# Patient Record
Sex: MALE | Race: WHITE | URBAN - METROPOLITAN AREA
[De-identification: names, ages, dates, MRNs, and addresses within clinical notes are randomized per-mention and may not be internally consistent; named-entity substitution may affect disease eponyms.]

---

## 2017-02-27 ENCOUNTER — OFFICE VISIT (OUTPATIENT)
Dept: FAMILY MEDICINE CLINIC | Age: 57
End: 2017-02-27

## 2017-02-27 VITALS
HEART RATE: 82 BPM | SYSTOLIC BLOOD PRESSURE: 110 MMHG | BODY MASS INDEX: 22.6 KG/M2 | DIASTOLIC BLOOD PRESSURE: 72 MMHG | WEIGHT: 144 LBS | TEMPERATURE: 99.1 F | OXYGEN SATURATION: 100 % | HEIGHT: 67 IN

## 2017-02-27 DIAGNOSIS — J02.9 PHARYNGITIS, UNSPECIFIED ETIOLOGY: ICD-10-CM

## 2017-02-27 DIAGNOSIS — J06.9 URI, ACUTE: Primary | ICD-10-CM

## 2017-02-27 LAB
INFLUENZA A ANTIBODY: NEGATIVE
INFLUENZA B ANTIBODY: NEGATIVE
S PYO AG THROAT QL: NORMAL

## 2017-02-27 PROCEDURE — 87804 INFLUENZA ASSAY W/OPTIC: CPT | Performed by: NURSE PRACTITIONER

## 2017-02-27 PROCEDURE — 99213 OFFICE O/P EST LOW 20 MIN: CPT | Performed by: NURSE PRACTITIONER

## 2017-02-27 PROCEDURE — 87880 STREP A ASSAY W/OPTIC: CPT | Performed by: NURSE PRACTITIONER

## 2017-02-27 RX ORDER — BROMPHENIRAMINE MALEATE, PSEUDOEPHEDRINE HYDROCHLORIDE, AND DEXTROMETHORPHAN HYDROBROMIDE 2; 30; 10 MG/5ML; MG/5ML; MG/5ML
5 SYRUP ORAL 4 TIMES DAILY PRN
Qty: 200 ML | Refills: 0 | Status: SHIPPED | OUTPATIENT
Start: 2017-02-27 | End: 2017-10-04

## 2017-02-27 RX ORDER — CLARITHROMYCIN 500 MG/1
500 TABLET, COATED ORAL 2 TIMES DAILY
Qty: 20 TABLET | Refills: 0 | Status: SHIPPED | OUTPATIENT
Start: 2017-02-27 | End: 2017-03-09

## 2017-02-27 ASSESSMENT — ENCOUNTER SYMPTOMS
GASTROINTESTINAL NEGATIVE: 1
EYES NEGATIVE: 1
ABDOMINAL PAIN: 0
RHINORRHEA: 0
WHEEZING: 0
DIARRHEA: 0
VOMITING: 0
COUGH: 1
SWOLLEN GLANDS: 0
SORE THROAT: 1
SINUS PAIN: 1
ALLERGIC/IMMUNOLOGIC NEGATIVE: 1
NAUSEA: 0
SINUS PRESSURE: 1

## 2017-03-01 LAB — THROAT CULTURE: NORMAL

## 2017-07-28 ENCOUNTER — OFFICE VISIT (OUTPATIENT)
Dept: ORTHOPEDIC SURGERY | Age: 57
End: 2017-07-28

## 2017-07-28 VITALS — HEIGHT: 67 IN | WEIGHT: 143.96 LBS | BODY MASS INDEX: 22.6 KG/M2

## 2017-07-28 DIAGNOSIS — S69.81XD TFCC (TRIANGULAR FIBROCARTILAGE COMPLEX) INJURY, RIGHT, SUBSEQUENT ENCOUNTER: ICD-10-CM

## 2017-07-28 DIAGNOSIS — R52 PAIN: Primary | ICD-10-CM

## 2017-07-28 PROCEDURE — 20605 DRAIN/INJ JOINT/BURSA W/O US: CPT | Performed by: ORTHOPAEDIC SURGERY

## 2017-07-28 PROCEDURE — 99213 OFFICE O/P EST LOW 20 MIN: CPT | Performed by: ORTHOPAEDIC SURGERY

## 2017-09-27 ENCOUNTER — TELEPHONE (OUTPATIENT)
Dept: FAMILY MEDICINE CLINIC | Age: 57
End: 2017-09-27

## 2017-09-27 DIAGNOSIS — Z11.4 SCREENING FOR HIV (HUMAN IMMUNODEFICIENCY VIRUS): ICD-10-CM

## 2017-09-27 DIAGNOSIS — Z12.5 SCREENING PSA (PROSTATE SPECIFIC ANTIGEN): ICD-10-CM

## 2017-09-27 DIAGNOSIS — Z11.59 ENCOUNTER FOR HEPATITIS C SCREENING TEST FOR LOW RISK PATIENT: ICD-10-CM

## 2017-09-27 DIAGNOSIS — Z00.00 ROUTINE GENERAL MEDICAL EXAMINATION AT A HEALTH CARE FACILITY: Primary | ICD-10-CM

## 2017-09-29 DIAGNOSIS — Z11.59 ENCOUNTER FOR HEPATITIS C SCREENING TEST FOR LOW RISK PATIENT: ICD-10-CM

## 2017-09-29 DIAGNOSIS — Z00.00 ROUTINE GENERAL MEDICAL EXAMINATION AT A HEALTH CARE FACILITY: ICD-10-CM

## 2017-09-29 DIAGNOSIS — Z12.5 SCREENING PSA (PROSTATE SPECIFIC ANTIGEN): ICD-10-CM

## 2017-09-29 DIAGNOSIS — Z11.4 SCREENING FOR HIV (HUMAN IMMUNODEFICIENCY VIRUS): ICD-10-CM

## 2017-09-29 LAB
A/G RATIO: 2 (ref 1.1–2.2)
ALBUMIN SERPL-MCNC: 4.6 G/DL (ref 3.4–5)
ALP BLD-CCNC: 52 U/L (ref 40–129)
ALT SERPL-CCNC: 27 U/L (ref 10–40)
ANION GAP SERPL CALCULATED.3IONS-SCNC: 13 MMOL/L (ref 3–16)
AST SERPL-CCNC: 28 U/L (ref 15–37)
BASOPHILS ABSOLUTE: 0 K/UL (ref 0–0.2)
BASOPHILS RELATIVE PERCENT: 0.5 %
BILIRUB SERPL-MCNC: 0.6 MG/DL (ref 0–1)
BUN BLDV-MCNC: 18 MG/DL (ref 7–20)
CALCIUM SERPL-MCNC: 9.9 MG/DL (ref 8.3–10.6)
CHLORIDE BLD-SCNC: 102 MMOL/L (ref 99–110)
CHOLESTEROL, TOTAL: 210 MG/DL (ref 0–199)
CO2: 27 MMOL/L (ref 21–32)
CREAT SERPL-MCNC: 1.1 MG/DL (ref 0.9–1.3)
EOSINOPHILS ABSOLUTE: 0.2 K/UL (ref 0–0.6)
EOSINOPHILS RELATIVE PERCENT: 4.4 %
GFR AFRICAN AMERICAN: >60
GFR NON-AFRICAN AMERICAN: >60
GLOBULIN: 2.3 G/DL
GLUCOSE BLD-MCNC: 90 MG/DL (ref 70–99)
HCT VFR BLD CALC: 48 % (ref 40.5–52.5)
HDLC SERPL-MCNC: 78 MG/DL (ref 40–60)
HEMOGLOBIN: 16.3 G/DL (ref 13.5–17.5)
HEPATITIS C ANTIBODY INTERPRETATION: NORMAL
LDL CHOLESTEROL CALCULATED: 119 MG/DL
LYMPHOCYTES ABSOLUTE: 2 K/UL (ref 1–5.1)
LYMPHOCYTES RELATIVE PERCENT: 36.3 %
MCH RBC QN AUTO: 31.6 PG (ref 26–34)
MCHC RBC AUTO-ENTMCNC: 34 G/DL (ref 31–36)
MCV RBC AUTO: 92.9 FL (ref 80–100)
MONOCYTES ABSOLUTE: 0.4 K/UL (ref 0–1.3)
MONOCYTES RELATIVE PERCENT: 8.2 %
NEUTROPHILS ABSOLUTE: 2.8 K/UL (ref 1.7–7.7)
NEUTROPHILS RELATIVE PERCENT: 50.6 %
PDW BLD-RTO: 13.2 % (ref 12.4–15.4)
PLATELET # BLD: 213 K/UL (ref 135–450)
PMV BLD AUTO: 8.8 FL (ref 5–10.5)
POTASSIUM SERPL-SCNC: 4.9 MMOL/L (ref 3.5–5.1)
PROSTATE SPECIFIC ANTIGEN: 0.69 NG/ML (ref 0–4)
RBC # BLD: 5.17 M/UL (ref 4.2–5.9)
SODIUM BLD-SCNC: 142 MMOL/L (ref 136–145)
TOTAL PROTEIN: 6.9 G/DL (ref 6.4–8.2)
TRIGL SERPL-MCNC: 67 MG/DL (ref 0–150)
TSH SERPL DL<=0.05 MIU/L-ACNC: 1.85 UIU/ML (ref 0.27–4.2)
VITAMIN D 25-HYDROXY: 53.4 NG/ML
VLDLC SERPL CALC-MCNC: 13 MG/DL
WBC # BLD: 5.5 K/UL (ref 4–11)

## 2017-10-02 LAB — HIV-1 AND HIV-2 ANTIBODIES: NORMAL

## 2017-10-04 ENCOUNTER — OFFICE VISIT (OUTPATIENT)
Dept: FAMILY MEDICINE CLINIC | Age: 57
End: 2017-10-04

## 2017-10-04 VITALS
DIASTOLIC BLOOD PRESSURE: 74 MMHG | HEIGHT: 67 IN | HEART RATE: 74 BPM | OXYGEN SATURATION: 98 % | SYSTOLIC BLOOD PRESSURE: 116 MMHG | TEMPERATURE: 97.7 F | BODY MASS INDEX: 21.82 KG/M2 | WEIGHT: 139 LBS

## 2017-10-04 DIAGNOSIS — E73.9 INTESTINAL DISACCHARIDASE DEFICIENCY: ICD-10-CM

## 2017-10-04 DIAGNOSIS — G89.29 CHRONIC GENERALIZED ABDOMINAL PAIN: ICD-10-CM

## 2017-10-04 DIAGNOSIS — Z00.00 ROUTINE GENERAL MEDICAL EXAMINATION AT A HEALTH CARE FACILITY: Primary | ICD-10-CM

## 2017-10-04 DIAGNOSIS — E78.2 MIXED HYPERLIPIDEMIA: ICD-10-CM

## 2017-10-04 DIAGNOSIS — J30.1 NON-SEASONAL ALLERGIC RHINITIS DUE TO POLLEN: ICD-10-CM

## 2017-10-04 DIAGNOSIS — R10.84 CHRONIC GENERALIZED ABDOMINAL PAIN: ICD-10-CM

## 2017-10-04 DIAGNOSIS — Z91.010 HISTORY OF PEANUT ALLERGY: ICD-10-CM

## 2017-10-04 DIAGNOSIS — N40.0 BENIGN PROSTATIC HYPERTROPHY WITHOUT URINARY OBSTRUCTION: ICD-10-CM

## 2017-10-04 DIAGNOSIS — R19.8 CHANGE IN BOWEL FUNCTION: ICD-10-CM

## 2017-10-04 DIAGNOSIS — E73.9 LACTOSE INTOLERANCE: ICD-10-CM

## 2017-10-04 DIAGNOSIS — R13.19 ESOPHAGEAL DYSPHAGIA: ICD-10-CM

## 2017-10-04 DIAGNOSIS — N52.9 ERECTILE DYSFUNCTION, UNSPECIFIED ERECTILE DYSFUNCTION TYPE: ICD-10-CM

## 2017-10-04 LAB
BILIRUBIN, POC: NORMAL
BLOOD URINE, POC: NORMAL
CLARITY, POC: CLEAR
COLOR, POC: YELLOW
GLUCOSE URINE, POC: NORMAL
KETONES, POC: NORMAL
LEUKOCYTE EST, POC: NORMAL
NITRITE, POC: NORMAL
PH, POC: 7.5
PROTEIN, POC: NORMAL
SPECIFIC GRAVITY, POC: 1
UROBILINOGEN, POC: 0.2

## 2017-10-04 PROCEDURE — 99396 PREV VISIT EST AGE 40-64: CPT | Performed by: FAMILY MEDICINE

## 2017-10-04 PROCEDURE — 81002 URINALYSIS NONAUTO W/O SCOPE: CPT | Performed by: FAMILY MEDICINE

## 2017-10-04 RX ORDER — OXYCODONE HYDROCHLORIDE AND ACETAMINOPHEN 5; 325 MG/1; MG/1
1-2 TABLET ORAL
COMMUNITY
Start: 2016-07-30 | End: 2017-10-04

## 2017-10-04 RX ORDER — TADALAFIL 5 MG/1
5 TABLET ORAL PRN
COMMUNITY

## 2017-10-04 ASSESSMENT — ENCOUNTER SYMPTOMS
ALLERGIC/IMMUNOLOGIC NEGATIVE: 1
ABDOMINAL DISTENTION: 0
ABDOMINAL PAIN: 1
RESPIRATORY NEGATIVE: 1
NAUSEA: 1
CONSTIPATION: 0
DIARRHEA: 1
ANAL BLEEDING: 0
EYES NEGATIVE: 1
BLOOD IN STOOL: 0

## 2017-10-04 NOTE — MR AVS SNAPSHOT
After Visit Summary             Forest Lee   10/4/2017 9:00 AM   Office Visit    Description:  Male : 1960   Provider:  Lucia Ayala MD   Department:  [address] [facility] [address]              Your Follow-Up and Future Appointments         Below is a list of your follow-up and future appointments. This may not be a complete list as you may have made appointments directly with providers that we are not aware of or your providers may have made some for you. Please call your providers to confirm appointments. It is important to keep your appointments. Please bring your current insurance card, photo ID, co-pay, and all medication bottles to your appointment. If self-pay, payment is expected at the time of service. Your To-Do List     Future Orders Complete By Expires    Celiac Reflex Panel [ZNS730 Custom]  10/4/2017 10/4/2018    FL Modified Barium Swallow [71792 Custom]  10/4/2017 10/4/2018    FL UGI W AIR CONTRAST [44793 Custom]  10/4/2017 10/5/2018    US Abdomen Complete [61611 Custom]  10/4/2017 10/4/2018         Information from Your Visit        Department     Name Address Phone Fax    [address] [facility] [address] [place] [address] [phone]      You Were Seen for:         Comments    Routine general medical examination at a health care facility   [V70.0. ICD-9-CM]         Vital Signs     Blood Pressure Pulse Temperature Height Weight Oxygen Saturation    116/74 74 97.7 °F (36.5 °C) (Temporal) 5' 7\" (1.702 m) 139 lb (63 kg) 98%    Body Mass Index Smoking Status                21.77 kg/m2 Never Smoker             Today's Medication Changes          These changes are accurate as of: 10/4/17  9:53 AM.  If you have any questions, ask your nurse or doctor.                STOP taking these medications           brompheniramine-pseudoephedrine-DM 30-2-10 MG/5ML syrup   Stopped by:  Lucia Ayala MD       oxyCODONE-acetaminophen 5-325 MG per tablet Commonly known as:  PERCOCET   Stopped by:  Rosa Hansen MD               Your Current Medications Are              EPIPEN 2-SHANEKA 0.3 MG/0.3ML SOAJ injection INJECT INTO THE MIDDLE OF THE OUTER THIGH AND HOLD FOR 10 SECONDS AS NEEDED FOR SEVERE ALLERGIC REACTION    fluticasone (FLONASE) 50 MCG/ACT nasal spray 2 sprays by Nasal route daily. Flaxseed, Linseed, 1000 MG CAPS Take 1 capsule by mouth Daily. fish oil-omega-3 fatty acids 1000 MG capsule Take 1 g by mouth daily. multivitamin (THERAGRAN) per tablet Take 1 tablet by mouth daily. aspirin 81 MG tablet Take 81 mg by mouth daily.       Allergies              Peanut-containing Drug Products Anaphylaxis    Penicillins     Sulfa Antibiotics     Peanuts [Peanut Oil]          Additional Information        Basic Information     Date Of Birth Sex Race Ethnicity Preferred Language    1960 Male White Non-/Non  English      Problem List as of 10/4/2017  Date Reviewed: 2/27/2017                Mixed hyperlipidemia    Non-seasonal allergic rhinitis due to pollen    Erectile dysfunction    Impingement syndrome of right shoulder    Incomplete tear of right rotator cuff    History of peanut allergy    Family history of colonic polyps    Routine general medical examination at a health care facility    Lactose intolerance    Chronic sinusitis    Benign prostatic hypertrophy without urinary obstruction    Intestinal disaccharidase deficiency      Your Goals as of 10/4/2017 at 9:53 AM                 Exercise    Exercise 3x per week (30 min per time)     Notes    8/10        Immunizations as of 10/4/2017     Name Date    Influenza Virus Vaccine 10/19/2015    Tdap (Boostrix, Adacel) 3/19/2009      Preventive Care        Date Due    Yearly Flu Vaccine (1) 9/1/2017    Tetanus Combination Vaccine (2 - Td) 3/19/2019    Colonoscopy 2/27/2022    Cholesterol Screening 9/29/2022            MyChart Signup

## 2017-10-04 NOTE — PROGRESS NOTES
10/4/17    Rik Andrews  1960      Chief Complaint   Patient presents with    Annual Exam   Well Adult Physical: Patient here for a comprehensive physical exam.The patient reports no problems  Do you take any herbs or supplements that were not prescribed by a doctor? yes Are you taking calcium supplements? no Are you taking aspirin daily? no   History:          Vitals:    10/04/17 0916   BP: 116/74   Pulse: 74   Temp: 97.7 °F (36.5 °C)   SpO2: 98%         Immunization History   Administered Date(s) Administered    Influenza Virus Vaccine 10/19/2015    Tdap (Boostrix, Adacel) 03/19/2009       Allergies   Allergen Reactions    Peanut-Containing Drug Products Anaphylaxis    Penicillins     Sulfa Antibiotics     Peanuts [Peanut Oil]      Outpatient Prescriptions Marked as Taking for the 10/4/17 encounter (Office Visit) with Balaji Melo MD   Medication Sig Dispense Refill    EPIPEN 2-SHANEKA 0.3 MG/0.3ML SOAJ injection INJECT INTO THE MIDDLE OF THE OUTER THIGH AND HOLD FOR 10 SECONDS AS NEEDED FOR SEVERE ALLERGIC REACTION 2 each 1    fluticasone (FLONASE) 50 MCG/ACT nasal spray 2 sprays by Nasal route daily. 1 Bottle 3    Flaxseed, Linseed, 1000 MG CAPS Take 1 capsule by mouth Daily.  fish oil-omega-3 fatty acids 1000 MG capsule Take 1 g by mouth daily.  multivitamin (THERAGRAN) per tablet Take 1 tablet by mouth daily.  aspirin 81 MG tablet Take 81 mg by mouth daily.          Past Medical History:   Diagnosis Date    Allergic rhinitis     Cancer ([address]     skin basal    Chicken pox     Personal history of peanut allergy 8/25/2011     Past Surgical History:   Procedure Laterality Date    HERNIA REPAIR      VASECTOMY      WISDOM TOOTH EXTRACTION       Family History   Problem Relation Age of Onset    Arthritis Father     Heart Disease Father     High Blood Pressure Father     High Cholesterol Father     Cancer Brother     Diabetes Maternal Grandfather     Diabetes Paternal Grandmother     Cancer Paternal Grandfather     Arthritis Mother      Social History     Social History    Marital status:      Spouse name: N/A    Number of children: N/A    Years of education: N/A     Occupational History    Not on file. Social History Main Topics    Smoking status: Never Smoker    Smokeless tobacco: Not on file    Alcohol use Yes    Drug use: Not on file    Sexual activity: Yes     Other Topics Concern    Not on file     Social History Narrative         Any recent diagnostic tests, hospital reports, office notes or consultation letters were reviewed prior to and during the visit. Review of Systems   Constitutional: Negative. HENT: Negative. Eyes: Negative. Respiratory: Negative. Cardiovascular: Negative. Gastrointestinal: Positive for abdominal pain (food get stuck alot on the way down ), diarrhea and nausea. Negative for abdominal distention, anal bleeding, blood in stool and constipation. Endocrine: Negative. Genitourinary: Negative. Musculoskeletal: Negative. Skin: Negative. Allergic/Immunologic: Negative. Neurological: Negative. Hematological: Negative. Psychiatric/Behavioral: Negative. Physical Exam   Constitutional: He is oriented to person, place, and time. He appears well-developed and well-nourished. Non-toxic appearance. No distress. HENT:   Head: Normocephalic and atraumatic. Right Ear: External ear normal. No swelling or tenderness. No middle ear effusion. Left Ear: External ear normal. No swelling or tenderness. No middle ear effusion. Nose: Nose normal. No mucosal edema or rhinorrhea. Right sinus exhibits no maxillary sinus tenderness and no frontal sinus tenderness. Left sinus exhibits no maxillary sinus tenderness and no frontal sinus tenderness. Mouth/Throat: Uvula is midline, oropharynx is clear and moist and mucous membranes are normal. No oral lesions. Normal dentition. No dental caries.  No oropharyngeal exudate, posterior oropharyngeal edema, posterior oropharyngeal erythema or tonsillar abscesses. Eyes: Conjunctivae and EOM are normal. Pupils are equal, round, and reactive to light. Right eye exhibits no discharge. Left eye exhibits no discharge. No scleral icterus. Neck: Normal range of motion. Neck supple. Normal carotid pulses, no hepatojugular reflux and no JVD present. No spinous process tenderness and no muscular tenderness present. Carotid bruit is not present. No rigidity. No tracheal deviation, no edema, no erythema and normal range of motion present. No thyroid mass and no thyromegaly present. Cardiovascular: Normal rate, regular rhythm, S1 normal, S2 normal, normal heart sounds and intact distal pulses. PMI is not displaced. Exam reveals no gallop, no distant heart sounds and no friction rub. No murmur heard. Pulses:       Dorsalis pedis pulses are 2+ on the right side, and 2+ on the left side. Posterior tibial pulses are 2+ on the right side, and 2+ on the left side. Pulmonary/Chest: Effort normal and breath sounds normal. No accessory muscle usage or stridor. No respiratory distress. He has no decreased breath sounds. He has no wheezes. He has no rales. He exhibits no tenderness. Abdominal: Soft. Bowel sounds are normal. He exhibits no distension, no abdominal bruit, no ascites and no mass. There is no hepatosplenomegaly. There is no tenderness. There is no rebound, no guarding and no CVA tenderness. No hernia. Hernia confirmed negative in the right inguinal area and confirmed negative in the left inguinal area. Genitourinary: Rectum normal, prostate normal, testes normal and penis normal. Rectal exam shows no external hemorrhoid, no fissure, no mass, no tenderness, anal tone normal and guaiac negative stool. Prostate is not enlarged and not tender. Right testis shows no mass, no swelling and no tenderness.  Left testis shows no mass, no swelling and no tenderness. No penile erythema or penile tenderness. No discharge found. Musculoskeletal: Normal range of motion. He exhibits no edema or tenderness. Right shoulder: Normal. He exhibits normal range of motion, no tenderness, no bony tenderness, no swelling, no effusion and no crepitus. Left shoulder: He exhibits no deformity, no laceration, no pain, no spasm, normal pulse and normal strength. Right elbow: Normal.He exhibits normal range of motion, no swelling, no effusion and no deformity. No tenderness found. Left elbow: Normal. He exhibits normal range of motion, no swelling, no effusion, no deformity and no laceration. No tenderness found. Right wrist: Normal. He exhibits normal range of motion, no tenderness, no bony tenderness, no swelling, no effusion, no crepitus and no deformity. Left wrist: Normal. He exhibits normal range of motion, no tenderness, no bony tenderness, no swelling, no effusion, no crepitus and no deformity. Right hip: Normal. He exhibits normal range of motion, normal strength, no tenderness, no bony tenderness, no swelling, no crepitus and no deformity. Left hip: Normal. He exhibits normal range of motion, normal strength, no tenderness, no bony tenderness, no swelling, no crepitus and no deformity. Right knee: Normal. He exhibits normal range of motion, no swelling, no effusion, no ecchymosis, no deformity, normal patellar mobility and no bony tenderness. Left knee: Normal. He exhibits normal range of motion, no swelling, no effusion, no ecchymosis, no deformity, normal alignment, normal patellar mobility and no bony tenderness. No tenderness found. Right ankle: Normal. He exhibits normal range of motion, no swelling, no ecchymosis, no deformity and normal pulse. Achilles tendon normal. Achilles tendon exhibits no pain and no defect.         Left ankle: Normal. He exhibits normal range of motion, no swelling, no normal.         1. Routine general medical examination at a health care facility  Age appropriate teaching done. Continue all treatments. Immunizations and health maintenance as needed   POCT Urinalysis no Micro   2. Mixed hyperlipidemia  Condition stable continue the medications, treatments, will check labs as appropriate       The patient received counseling on the following healthy behaviors: nutrition, exercise, medication adherence and decrease in alcohol consumption    Patient given educational materials on Hyperlipidemia and Nutrition if appropriate    I have instructed the patient to complete a self tracking handout on diet and instructed them to bring it with them to the  next appointment. Discussed use, benefit, and side effects of prescribed medications. Barriers to medication compliance addressed. All patient questions answered. Pt voiced understanding. 3. Lactose intolerance  Condition stable continue the medications, treatments, will check labs as appropriate       4. Non-seasonal allergic rhinitis due to pollen Condition stable continue the medications, treatments, will check labs as appropriate     5. History of peanut allergy Condition stable continue the medications, treatments, will check labs as appropriate     6. Benign prostatic hypertrophy without urinary obstruction Condition stable continue the medications, treatments, will check labs as appropriate     7. Intestinal disaccharidase deficiency Condition stable continue the medications, treatments, will check labs as appropriate     8. Chronic generalized abdominal pain The condition is deteriorating, will change treatment, investigate cause and make further recommendations when data back. Celiac Reflex Panel    US Abdomen Complete   9. Change in bowel function The condition is deteriorating, will change treatment, investigate cause and make further recommendations when data back.    Celiac Reflex Panel    US Abdomen Complete

## 2017-10-06 LAB
IGA: 147 MG/DL (ref 68–408)
TISSUE TRANSGLUTAMINASE IGA: 1 U/ML (ref 0–3)

## 2017-11-03 ENCOUNTER — TELEPHONE (OUTPATIENT)
Dept: FAMILY MEDICINE CLINIC | Age: 57
End: 2017-11-03

## 2017-11-03 DIAGNOSIS — R13.14 PHARYNGOESOPHAGEAL DYSPHAGIA: Primary | ICD-10-CM

## 2018-04-25 ENCOUNTER — HOSPITAL ENCOUNTER (OUTPATIENT)
Dept: ULTRASOUND IMAGING | Age: 58
Discharge: HOME | End: 2018-04-25
Attending: FAMILY MEDICINE | Admitting: FAMILY MEDICINE

## 2018-04-25 DIAGNOSIS — R19.8 CHANGE IN BOWEL FUNCTION: ICD-10-CM

## 2018-04-25 DIAGNOSIS — R10.84 GENERALIZED ABDOMINAL PAIN: ICD-10-CM

## 2018-04-25 DIAGNOSIS — G89.29 CHRONIC GENERALIZED ABDOMINAL PAIN: ICD-10-CM

## 2018-04-25 DIAGNOSIS — R10.84 CHRONIC GENERALIZED ABDOMINAL PAIN: ICD-10-CM

## 2018-07-19 ENCOUNTER — TELEPHONE (OUTPATIENT)
Dept: FAMILY MEDICINE CLINIC | Age: 58
End: 2018-07-19

## 2018-08-10 ENCOUNTER — OFFICE VISIT (OUTPATIENT)
Dept: FAMILY MEDICINE CLINIC | Age: 58
End: 2018-08-10

## 2018-08-10 VITALS
DIASTOLIC BLOOD PRESSURE: 68 MMHG | SYSTOLIC BLOOD PRESSURE: 102 MMHG | BODY MASS INDEX: 21.83 KG/M2 | TEMPERATURE: 98 F | OXYGEN SATURATION: 98 % | HEART RATE: 60 BPM | WEIGHT: 139.4 LBS

## 2018-08-10 DIAGNOSIS — Z76.89 ESTABLISHING CARE WITH NEW DOCTOR, ENCOUNTER FOR: Primary | ICD-10-CM

## 2018-08-10 DIAGNOSIS — Z91.010 H/O PEANUT ALLERGY: ICD-10-CM

## 2018-08-10 DIAGNOSIS — R19.5 LOOSE STOOLS: ICD-10-CM

## 2018-08-10 DIAGNOSIS — E78.2 MIXED HYPERLIPIDEMIA: ICD-10-CM

## 2018-08-10 DIAGNOSIS — T78.2XXD ANAPHYLAXIS, SUBSEQUENT ENCOUNTER: ICD-10-CM

## 2018-08-10 DIAGNOSIS — J30.1 NON-SEASONAL ALLERGIC RHINITIS DUE TO POLLEN: ICD-10-CM

## 2018-08-10 DIAGNOSIS — R63.4 WEIGHT LOSS: ICD-10-CM

## 2018-08-10 DIAGNOSIS — N52.9 ERECTILE DYSFUNCTION, UNSPECIFIED ERECTILE DYSFUNCTION TYPE: ICD-10-CM

## 2018-08-10 PROCEDURE — 99214 OFFICE O/P EST MOD 30 MIN: CPT | Performed by: FAMILY MEDICINE

## 2018-08-10 RX ORDER — CLOBETASOL PROPIONATE 0.5 MG/G
OINTMENT TOPICAL
COMMUNITY
Start: 2018-06-13

## 2018-08-10 RX ORDER — IMIQUIMOD 12.5 MG/.25G
CREAM TOPICAL PRN
COMMUNITY
Start: 2018-06-05

## 2018-08-10 RX ORDER — EPINEPHRINE 0.3 MG/.3ML
INJECTION SUBCUTANEOUS
Qty: 2 EACH | Refills: 1 | Status: SHIPPED | OUTPATIENT
Start: 2018-08-10 | End: 2020-09-25 | Stop reason: SDUPTHER

## 2018-08-10 ASSESSMENT — PATIENT HEALTH QUESTIONNAIRE - PHQ9
SUM OF ALL RESPONSES TO PHQ QUESTIONS 1-9: 0
2. FEELING DOWN, DEPRESSED OR HOPELESS: 0
SUM OF ALL RESPONSES TO PHQ QUESTIONS 1-9: 0
1. LITTLE INTEREST OR PLEASURE IN DOING THINGS: 0
SUM OF ALL RESPONSES TO PHQ9 QUESTIONS 1 & 2: 0

## 2018-08-10 ASSESSMENT — ENCOUNTER SYMPTOMS
SHORTNESS OF BREATH: 0
BLOOD IN STOOL: 0
DIARRHEA: 1
RHINORRHEA: 0
ABDOMINAL PAIN: 1
BACK PAIN: 0
WHEEZING: 0
SORE THROAT: 0
COLOR CHANGE: 0
CONSTIPATION: 0
TROUBLE SWALLOWING: 1

## 2018-08-10 NOTE — PATIENT INSTRUCTIONS
Make appointment with gastroenterolgy (GI);  You can decide at your preference to go to Dr. Kathrin Hankins [facility]) or the [address] [place] [address] (TDI)  [address] current medicines/supplements    Return in 3 months to discuss GI results/plans and for well adult exam/health maintenance review/vaccines  Patient Education        Diarrhea: Care Instructions  Your Care Instructions    Diarrhea is loose, watery stools (bowel movements). The exact cause is often hard to find. Sometimes diarrhea is your body's way of getting rid of what caused an upset stomach. Viruses, food poisoning, and many medicines can cause diarrhea. Some people get diarrhea in response to emotional stress, anxiety, or certain foods. Almost everyone has diarrhea now and then. It usually isn't serious, and your stools will return to normal soon. The important thing to do is replace the fluids you have lost, so you can prevent dehydration. The doctor has checked you carefully, but problems can develop later. If you notice any problems or new symptoms, get medical treatment right away. Follow-up care is a key part of your treatment and safety. Be sure to make and go to all appointments, and call your doctor if you are having problems. It's also a good idea to know your test results and keep a list of the medicines you take. How can you care for yourself at home? · Watch for signs of dehydration, which means your body has lost too much water. Dehydration is a serious condition and should be treated right away. Signs of dehydration are:  ¨ Increasing thirst and dry eyes and mouth. ¨ Feeling faint or lightheaded. ¨ Darker urine, and a smaller amount of urine than normal.  · To prevent dehydration, drink plenty of fluids, enough so that your urine is light yellow or clear like water. Choose water and other caffeine-free clear liquids until you feel better.  If you have kidney, heart, or liver disease and have to limit fluids, talk with your doctor before you increase the amount of fluids you drink. · Begin eating small amounts of mild foods the next day, if you feel like it. ¨ Try yogurt that has live cultures of Lactobacillus. (Check the label.)  ¨ Avoid spicy foods, fruits, alcohol, and caffeine until 48 hours after all symptoms are gone. ¨ Avoid chewing gum that contains sorbitol. ¨ Avoid dairy products (except for yogurt with Lactobacillus) while you have diarrhea and for 3 days after symptoms are gone. · The doctor may recommend that you take over-the-counter medicine, such as loperamide (Imodium), if you still have diarrhea after 6 hours. Read and follow all instructions on the label. Do not use this medicine if you have bloody diarrhea, a high fever, or other signs of serious illness. Call your doctor if you think you are having a problem with your medicine. When should you call for help? Call 911 anytime you think you may need emergency care. For example, call if:    · You passed out (lost consciousness).     · Your stools are maroon or very bloody.    Call your doctor now or seek immediate medical care if:    · You are dizzy or lightheaded, or you feel like you may faint.     · Your stools are black and look like tar, or they have streaks of blood.     · You have new or worse belly pain.     · You have symptoms of dehydration, such as:  ¨ Dry eyes and a dry mouth. ¨ Passing only a little dark urine. ¨ Feeling thirstier than usual.     · You have a new or higher fever.    Watch closely for changes in your health, and be sure to contact your doctor if:    · Your diarrhea is getting worse.     · You see pus in the diarrhea.     · You are not getting better after 2 days (48 hours). Where can you learn more? Go to [email or web address] org and sign in to your [employer] account. Enter E385 in the Vinvel[employer] box to learn more about \"Diarrhea: Care Instructions. \"     If you do

## 2018-08-10 NOTE — PROGRESS NOTES
SUBJECTIVE:  Chief Complaint   Patient presents with    GI Problem     Diarrhea, or loss bowel its been about 1 year, patient would like to  get a referral to GI      Kendra Agustin is a 60 y.o.male that presents today for establish care. Former patient of Dr. Heriberto Lopez. Patient concerned about having bad malabsorption issues. States it started back in August of 2017. Patient found that symptoms were limited when he ate meat, vegetables, and fruits. This Spring these same self soothing tactics have worked, but the past 2 months this has not helped his symptoms. Patient states that his stool is occasionally diarrhea but frequently it is formless. Occasional has a formed and bulked stool 1x week, otherwise loose. Having 1-2 bowel movements a day. Patient states that prior to this he was having issues with constipation. Does have some associated abdominal pain/cramping, indigestion. No hematochezia or melena, has had small hemorrhoids in past, no straining. Patient states the only stressor he can attribute to his bowel habits is his work having to travel and commute every other week to [place]. Patient states he has always had to eat more than most people to maintain his weight. Started to eat more tree nuts to supplement his protein in take. He had an anaphylactic reaction to peanuts as a kid, has epi-pen on hand, needs refill today. The patient also had trouble swallowing one year ago. Would have to drink fluids prior to eating to help foods swallow. Feeling like his food is getting stuck. Never lasting more than a minute. No acid reflux, regurgitation, no hematemesis or hemoptysis.     The 10-year ASCVD risk score (Spring Charles, et al., 2013) is: 4%    Values used to calculate the score:      Age: 60 years      Sex: Male      Is Non- : No      Diabetic: No      Tobacco smoker: No      Systolic Blood Pressure: 169 mmHg      Is BP treated: No      HDL Cholesterol: 78 questions answered. Patient verbalized understanding to treatment plan and questions were answered. Return in about 3 months (around 11/10/2018). Janusz Briceno.  Sarita Chavez.      8/10/2018

## 2018-12-05 NOTE — TELEPHONE ENCOUNTER
Pt wants to know if he/his family could get the clown candy box back when the office closes?   Thank you 01-Dec-2018

## 2019-03-12 ENCOUNTER — TELEPHONE (OUTPATIENT)
Dept: FAMILY MEDICINE CLINIC | Age: 59
End: 2019-03-12

## 2019-04-05 ENCOUNTER — OFFICE VISIT (OUTPATIENT)
Dept: FAMILY MEDICINE CLINIC | Age: 59
End: 2019-04-05
Payer: COMMERCIAL

## 2019-04-05 VITALS
WEIGHT: 135.8 LBS | TEMPERATURE: 97.6 F | DIASTOLIC BLOOD PRESSURE: 70 MMHG | OXYGEN SATURATION: 98 % | HEIGHT: 67 IN | HEART RATE: 78 BPM | SYSTOLIC BLOOD PRESSURE: 104 MMHG | BODY MASS INDEX: 21.31 KG/M2

## 2019-04-05 DIAGNOSIS — Z23 NEED FOR TDAP VACCINATION: ICD-10-CM

## 2019-04-05 DIAGNOSIS — C44.320 SQUAMOUS CELL SKIN CANCER, FACE: ICD-10-CM

## 2019-04-05 DIAGNOSIS — R63.4 WEIGHT LOSS: ICD-10-CM

## 2019-04-05 DIAGNOSIS — Z13.220 SCREENING CHOLESTEROL LEVEL: ICD-10-CM

## 2019-04-05 DIAGNOSIS — Z00.00 WELL ADULT EXAM: ICD-10-CM

## 2019-04-05 DIAGNOSIS — Z12.5 SCREENING PSA (PROSTATE SPECIFIC ANTIGEN): ICD-10-CM

## 2019-04-05 DIAGNOSIS — R19.5 LOOSE STOOLS: Primary | ICD-10-CM

## 2019-04-05 PROCEDURE — 90715 TDAP VACCINE 7 YRS/> IM: CPT | Performed by: FAMILY MEDICINE

## 2019-04-05 PROCEDURE — 90471 IMMUNIZATION ADMIN: CPT | Performed by: FAMILY MEDICINE

## 2019-04-05 PROCEDURE — 99214 OFFICE O/P EST MOD 30 MIN: CPT | Performed by: FAMILY MEDICINE

## 2019-04-05 SDOH — SOCIAL STABILITY: SOCIAL NETWORK: IN A TYPICAL WEEK, HOW MANY TIMES DO YOU TALK ON THE PHONE WITH FAMILY, FRIENDS, OR NEIGHBORS?: ONCE A WEEK

## 2019-04-05 SDOH — ECONOMIC STABILITY: TRANSPORTATION INSECURITY
IN THE PAST 12 MONTHS, HAS LACK OF TRANSPORTATION KEPT YOU FROM MEETINGS, WORK, OR FROM GETTING THINGS NEEDED FOR DAILY LIVING?: NO

## 2019-04-05 SDOH — SOCIAL STABILITY: SOCIAL INSECURITY
WITHIN THE LAST YEAR, HAVE TO BEEN RAPED OR FORCED TO HAVE ANY KIND OF SEXUAL ACTIVITY BY YOUR PARTNER OR EX-PARTNER?: NO

## 2019-04-05 SDOH — HEALTH STABILITY: PHYSICAL HEALTH: ON AVERAGE, HOW MANY MINUTES DO YOU ENGAGE IN EXERCISE AT THIS LEVEL?: 60 MIN

## 2019-04-05 SDOH — ECONOMIC STABILITY: TRANSPORTATION INSECURITY
IN THE PAST 12 MONTHS, HAS THE LACK OF TRANSPORTATION KEPT YOU FROM MEDICAL APPOINTMENTS OR FROM GETTING MEDICATIONS?: NO

## 2019-04-05 SDOH — ECONOMIC STABILITY: FOOD INSECURITY: WITHIN THE PAST 12 MONTHS, THE FOOD YOU BOUGHT JUST DIDN'T LAST AND YOU DIDN'T HAVE MONEY TO GET MORE.: NEVER TRUE

## 2019-04-05 SDOH — HEALTH STABILITY: PHYSICAL HEALTH: ON AVERAGE, HOW MANY DAYS PER WEEK DO YOU ENGAGE IN MODERATE TO STRENUOUS EXERCISE (LIKE A BRISK WALK)?: 6 DAYS

## 2019-04-05 SDOH — ECONOMIC STABILITY: FOOD INSECURITY: WITHIN THE PAST 12 MONTHS, YOU WORRIED THAT YOUR FOOD WOULD RUN OUT BEFORE YOU GOT MONEY TO BUY MORE.: NEVER TRUE

## 2019-04-05 SDOH — SOCIAL STABILITY: SOCIAL NETWORK: ARE YOU MARRIED, WIDOWED, DIVORCED, SEPARATED, NEVER MARRIED, OR LIVING WITH A PARTNER?: MARRIED

## 2019-04-05 SDOH — SOCIAL STABILITY: SOCIAL INSECURITY
WITHIN THE LAST YEAR, HAVE YOU BEEN KICKED, HIT, SLAPPED, OR OTHERWISE PHYSICALLY HURT BY YOUR PARTNER OR EX-PARTNER?: NO

## 2019-04-05 SDOH — SOCIAL STABILITY: SOCIAL NETWORK: HOW OFTEN DO YOU ATTEND CHURCH OR RELIGIOUS SERVICES?: MORE THAN 4 TIMES PER YEAR

## 2019-04-05 SDOH — SOCIAL STABILITY: SOCIAL NETWORK: HOW OFTEN DO YOU ATTENT MEETINGS OF THE CLUB OR ORGANIZATION YOU BELONG TO?: NEVER

## 2019-04-05 SDOH — SOCIAL STABILITY: SOCIAL INSECURITY: WITHIN THE LAST YEAR, HAVE YOU BEEN HUMILIATED OR EMOTIONALLY ABUSED IN OTHER WAYS BY YOUR PARTNER OR EX-PARTNER?: NO

## 2019-04-05 SDOH — SOCIAL STABILITY: SOCIAL INSECURITY: WITHIN THE LAST YEAR, HAVE YOU BEEN AFRAID OF YOUR PARTNER OR EX-PARTNER?: NO

## 2019-04-05 SDOH — SOCIAL STABILITY: SOCIAL NETWORK
DO YOU BELONG TO ANY CLUBS OR ORGANIZATIONS SUCH AS CHURCH GROUPS UNIONS, FRATERNAL OR ATHLETIC GROUPS, OR SCHOOL GROUPS?: YES

## 2019-04-05 SDOH — SOCIAL STABILITY: SOCIAL NETWORK: HOW OFTEN DO YOU GET TOGETHER WITH FRIENDS OR RELATIVES?: ONCE A WEEK

## 2019-04-05 SDOH — ECONOMIC STABILITY: INCOME INSECURITY: HOW HARD IS IT FOR YOU TO PAY FOR THE VERY BASICS LIKE FOOD, HOUSING, MEDICAL CARE, AND HEATING?: NOT HARD AT ALL

## 2019-04-05 SDOH — HEALTH STABILITY: MENTAL HEALTH
STRESS IS WHEN SOMEONE FEELS TENSE, NERVOUS, ANXIOUS, OR CAN'T SLEEP AT NIGHT BECAUSE THEIR MIND IS TROUBLED. HOW STRESSED ARE YOU?: TO SOME EXTENT

## 2019-04-05 ASSESSMENT — ENCOUNTER SYMPTOMS
SORE THROAT: 0
RHINORRHEA: 1
ABDOMINAL PAIN: 1
SHORTNESS OF BREATH: 0
SINUS PRESSURE: 1
DIARRHEA: 1

## 2019-04-05 ASSESSMENT — PATIENT HEALTH QUESTIONNAIRE - PHQ9
1. LITTLE INTEREST OR PLEASURE IN DOING THINGS: 0
SUM OF ALL RESPONSES TO PHQ QUESTIONS 1-9: 0
SUM OF ALL RESPONSES TO PHQ QUESTIONS 1-9: 0
2. FEELING DOWN, DEPRESSED OR HOPELESS: 0
SUM OF ALL RESPONSES TO PHQ9 QUESTIONS 1 & 2: 0

## 2019-04-05 NOTE — PROGRESS NOTES
SUBJECTIVE:  Chief Complaint   Patient presents with    GI Problem     bowel issues; have improved    Skin Cancer     sqcc x 2 on face, going to have MOHS    Immunizations     needs Tdap today     Tammy Villaseñor is a 60 y.o.male that presents today for follow up of bowel issues. Saw Dr. Jaspreet Matos with [facility] 9/4/2018 who had him use metamucil. Helped some what but not a lot. Had colonoscopy before that as well by Dr. Verba Cranker with [facility]. Returned to Dr. Jaspreet Matos yesterday 4/4/19 in follow up of gastroenterology. Since doing better and none of the tests showed anything, and no new suggestions. Patient has been reading the plan paradox book and sticking to the dietary guidelines/restrictions in the book. When he sticks to the plan he noticed that his weight is stable and his bowel habits are improved. Weight down about 4 pounds today, which patient explains that he restarted the [address] and this makes him lose some weight initially. Patient with 2 squamous cell cancers on his face, seen by Dr. Marjan Jiang his dermatologist, planning to go see MOHS surgeon Dr. Roxy Regan for this. Due for Tdap today, patient agreeable to this.     Requests blood work for annual physical    Past Medical History:   Diagnosis Date    Allergic rhinitis     Cancer ([address]     skin basal    Chicken pox     Personal history of peanut allergy 8/25/2011    Squamous cell skin cancer, face      Past Surgical History:   Procedure Laterality Date    HERNIA REPAIR      VASECTOMY      WISDOM TOOTH EXTRACTION       Family History   Problem Relation Age of Onset    Arthritis Father     Heart Disease Father     High Blood Pressure Father     High Cholesterol Father     Cancer Brother         carcinoids    Diabetes Maternal Grandfather     Diabetes Paternal Grandmother     Cancer Paternal Grandfather     Arthritis Mother      Current Outpatient Medications   Medication Sig Dispense Refill    clobetasol (TEMOVATE) 0.05 % ointment       imiquimod (ALDARA) 5 % cream       EPINEPHrine (EPIPEN 2-SHANEKA) 0.3 MG/0.3ML SOAJ injection INJECT INTO THE MIDDLE OF THE OUTER THIGH AND HOLD FOR 10 SECONDS AS NEEDED FOR SEVERE ALLERGIC REACTION 2 each 1    fluticasone (FLONASE) 50 MCG/ACT nasal spray 2 sprays by Nasal route daily. 1 Bottle 3    fish oil-omega-3 fatty acids 1000 MG capsule Take 1 g by mouth daily.  multivitamin (THERAGRAN) per tablet Take 1 tablet by mouth daily.  tadalafil (CIALIS) 5 MG tablet Take 5 mg by mouth as needed for Erectile Dysfunction      Flaxseed, Linseed, 1000 MG CAPS Take 1 capsule by mouth Daily. No current facility-administered medications for this visit. Allergies   Allergen Reactions    Peanut-Containing Drug Products Anaphylaxis    Penicillins     Sulfa Antibiotics     Peanuts [Peanut Oil]        Social History     Socioeconomic History    Marital status:      Spouse name: Raquel López    Number of children: 2    Years of education: Not on file    Highest education level: Not on file   Occupational History    Not on file   Social Needs    Financial resource strain: Not hard at all   [employer] insecurity:     Worry: Never true     Inability: Never true   [employer] needs:     Medical: No     Non-medical: No   Tobacco Use    Smoking status: Never Smoker    Smokeless tobacco: Never Used   Substance and Sexual Activity    Alcohol use: Yes     Comment: social 2-3 x a week    Drug use: No    Sexual activity: Yes     Partners: Female   Lifestyle    Physical activity:     Days per week: 6 days     Minutes per session: 60 min    Stress:  To some extent   Relationships    Social connections:     Talks on phone: Once a week     Gets together: Once a week     Attends Alevism service: More than 4 times per year     Active member of club or organization: Yes     Attends meetings of clubs or organizations: Never     Relationship exhibits no maxillary sinus tenderness and no frontal sinus tenderness. Left sinus exhibits no maxillary sinus tenderness and no frontal sinus tenderness. Mouth/Throat: Uvula is midline, oropharynx is clear and moist and mucous membranes are normal.   Eyes: Pupils are equal, round, and reactive to light. EOM are normal.   Neck: No tracheal deviation present. Cardiovascular: Normal rate, regular rhythm and normal heart sounds. Exam reveals no gallop and no friction rub. No murmur heard. Pulmonary/Chest: Effort normal and breath sounds normal. No respiratory distress. He has no wheezes. He has no rales. Abdominal: Soft. Bowel sounds are normal. He exhibits no distension. There is no hepatosplenomegaly. There is no tenderness. There is no rebound and no CVA tenderness. Musculoskeletal: Normal range of motion. He exhibits no edema or tenderness. Lymphadenopathy:     He has no cervical adenopathy. Neurological: He is alert and oriented to person, place, and time. No cranial nerve deficit. Skin: Skin is warm and dry. No rash noted. No erythema. Psychiatric: He has a normal mood and affect. His speech is normal. He expresses no homicidal and no suicidal ideation. ASSESSMENT/PLAN:  Shaka Glez is a 79-year-old male seen in follow-up for loose stools and weight loss, mild absorption issues. Has seen gastroenterology ×2 and had colonoscopy since that time. He also esophagogastroduodenoscopy and showed small hiatal hernia. Currently doing well with regimen of Metamucil and using lectin free/The Plant Paradox Diet. Patient will continue using this treatment plan for now. Patient also need for Tdap vaccination today, given today. Patient will return in the next 3 months for well adult exam, and can get labs drawn beforehand which were ordered today. Squamous cell carcinoma on the face ×2 and patient will see Dr. Libby Mendoza for Mohs surgery/excision/treatment.   Consider shingles vaccine at patient's annual physical.    1. Loose stools  2. Weight loss  -improved with dietary regimen and metamucil; follow with GI if symptoms return/worsen/persist    3. Squamous cell skin cancer, face  -dx on pathology by dermatology Dr. Gisel Steinberg; have patient follow up with Dr. Salud Hoffman for Mohs surgery    4. Need for Tdap vaccination  Given today  - Tdap (age 10y-63y) IM (Adacel)    11. Well adult exam  -will do well adult exam at next OV; labs beforehand ordered today  - Comprehensive Metabolic Panel; Future  - CBC Auto Differential; Future    6. Screening cholesterol level  - Lipid Panel; Future    7. Screening PSA (prostate specific antigen)  - PSA PROSTATIC SPECIFIC ANTIGEN; Future    Reviewed treatment plan with patient. Patient verbalized understanding to treatment plan and questions were answered. Return in about 2 months (around 6/5/2019) for annual exam/blood work. Edwin Evans.  Emiliano Faulkner.      4/5/2019

## 2019-04-05 NOTE — PATIENT INSTRUCTIONS
Get blood work for annual exam in future  Follow up in 8-12 weeks for annual exam  See skin doctor at your convenience  Continue current diet for gut health  Get Tdap today  Consider shingles vaccine at annual exam

## 2019-04-09 ENCOUNTER — PATIENT MESSAGE (OUTPATIENT)
Dept: FAMILY MEDICINE CLINIC | Age: 59
End: 2019-04-09

## 2019-04-10 NOTE — TELEPHONE ENCOUNTER
From: Ren Lujan  To: Rosario Nieto. Guzman Stevenson., DO  Sent: 4/9/2019 4:21 PM EDT  Subject: Non-Urgent Medical Question    Are the health screening imaging tests worthwhile? [facility] is doing a lot of advertising currently. They're doing 5 tests (carotid artery, heart rhythm, aortic, PAD, osteoporosis) for $150. Does [facility] do something similar? Or is it usually a waste of money?

## 2019-04-17 DIAGNOSIS — Z13.220 SCREENING CHOLESTEROL LEVEL: ICD-10-CM

## 2019-04-17 DIAGNOSIS — Z00.00 WELL ADULT EXAM: ICD-10-CM

## 2019-04-17 DIAGNOSIS — Z12.5 SCREENING PSA (PROSTATE SPECIFIC ANTIGEN): ICD-10-CM

## 2019-04-17 LAB
A/G RATIO: 2 (ref 1.1–2.2)
ALBUMIN SERPL-MCNC: 4.3 G/DL (ref 3.4–5)
ALP BLD-CCNC: 59 U/L (ref 40–129)
ALT SERPL-CCNC: 25 U/L (ref 10–40)
ANION GAP SERPL CALCULATED.3IONS-SCNC: 14 MMOL/L (ref 3–16)
AST SERPL-CCNC: 30 U/L (ref 15–37)
BASOPHILS ABSOLUTE: 0 K/UL (ref 0–0.2)
BASOPHILS RELATIVE PERCENT: 0.6 %
BILIRUB SERPL-MCNC: 0.5 MG/DL (ref 0–1)
BUN BLDV-MCNC: 17 MG/DL (ref 7–20)
CALCIUM SERPL-MCNC: 9.2 MG/DL (ref 8.3–10.6)
CHLORIDE BLD-SCNC: 103 MMOL/L (ref 99–110)
CHOLESTEROL, TOTAL: 212 MG/DL (ref 0–199)
CO2: 25 MMOL/L (ref 21–32)
CREAT SERPL-MCNC: 1.1 MG/DL (ref 0.8–1.3)
EOSINOPHILS ABSOLUTE: 0.8 K/UL (ref 0–0.6)
EOSINOPHILS RELATIVE PERCENT: 12.3 %
GFR AFRICAN AMERICAN: >60
GFR NON-AFRICAN AMERICAN: >60
GLOBULIN: 2.1 G/DL
GLUCOSE BLD-MCNC: 102 MG/DL (ref 70–99)
HCT VFR BLD CALC: 42.8 % (ref 40.5–52.5)
HDLC SERPL-MCNC: 94 MG/DL (ref 40–60)
HEMOGLOBIN: 14.6 G/DL (ref 13.5–17.5)
LDL CHOLESTEROL CALCULATED: 105 MG/DL
LYMPHOCYTES ABSOLUTE: 2 K/UL (ref 1–5.1)
LYMPHOCYTES RELATIVE PERCENT: 31.3 %
MCH RBC QN AUTO: 32 PG (ref 26–34)
MCHC RBC AUTO-ENTMCNC: 34.1 G/DL (ref 31–36)
MCV RBC AUTO: 93.8 FL (ref 80–100)
MONOCYTES ABSOLUTE: 0.5 K/UL (ref 0–1.3)
MONOCYTES RELATIVE PERCENT: 7.4 %
NEUTROPHILS ABSOLUTE: 3 K/UL (ref 1.7–7.7)
NEUTROPHILS RELATIVE PERCENT: 48.4 %
PDW BLD-RTO: 13.2 % (ref 12.4–15.4)
PLATELET # BLD: 191 K/UL (ref 135–450)
PMV BLD AUTO: 8.6 FL (ref 5–10.5)
POTASSIUM SERPL-SCNC: 4.5 MMOL/L (ref 3.5–5.1)
PROSTATE SPECIFIC ANTIGEN: 0.5 NG/ML (ref 0–4)
RBC # BLD: 4.56 M/UL (ref 4.2–5.9)
SODIUM BLD-SCNC: 142 MMOL/L (ref 136–145)
TOTAL PROTEIN: 6.4 G/DL (ref 6.4–8.2)
TRIGL SERPL-MCNC: 63 MG/DL (ref 0–150)
VLDLC SERPL CALC-MCNC: 13 MG/DL
WBC # BLD: 6.3 K/UL (ref 4–11)

## 2019-05-14 ENCOUNTER — PROCEDURE VISIT (OUTPATIENT)
Dept: SURGERY | Age: 59
End: 2019-05-14
Payer: COMMERCIAL

## 2019-05-14 VITALS
BODY MASS INDEX: 21.46 KG/M2 | DIASTOLIC BLOOD PRESSURE: 68 MMHG | HEART RATE: 60 BPM | WEIGHT: 137 LBS | TEMPERATURE: 97.7 F | SYSTOLIC BLOOD PRESSURE: 108 MMHG | OXYGEN SATURATION: 97 %

## 2019-05-14 DIAGNOSIS — C44.329 SQUAMOUS CELL CARCINOMA OF FOREHEAD: Primary | ICD-10-CM

## 2019-05-14 PROCEDURE — 17311 MOHS 1 STAGE H/N/HF/G: CPT | Performed by: DERMATOLOGY

## 2019-05-14 PROCEDURE — 12051 INTMD RPR FACE/MM 2.5 CM/<: CPT | Performed by: DERMATOLOGY

## 2019-05-14 NOTE — PROGRESS NOTES
of Mohs. A map was prepared to correspond to the area of skin from which it was excised. Hemostasis was achieved using electrosurgery. The wound was bandaged. The tissue was prepared for the cryostat and sectioned. 1 section(s) prepared. Each section was coded, cut, and stained for microscopic examination. The entire base and margins of the excised piece of tissue were examined by the surgeon. No tumor was identified at the peripheral margins of stage I of microscopically controlled surgery. DEFECT MANAGEMENT:    REPAIR DESCRIPTION:  Various closure modalities were discussed with the patient, and it was decided that an intermediate layered repair would best preserve normal anatomic and functional relationships. Additional risk of wound dehiscence was discussed. The area was anesthetized with 1% lidocaine with epinephrine 1:100,000 buffered, was given a sterile prep using Chlorhexidine gluconate 4% solution and draped in the usual sterile fashion. Recreation and enlargement of the wound was performed by excising cones of tissue via the triangulation technique. The final incision lines were placed with respect for the patient's natural skin tension lines in a linear configuration to avoid functional and aesthetic distortion of adjacent free margins. Following minimal undermining, meticulous hemostasis was obtained with spot monopolar electrocoagulation. Subcutaneous dead space and dermis were closed using 5-0 Vicryl buried subcutaneous interrupted suture and the epidermis was approximated with 6-0 Fast absorbing gut running epidermal sutures. WOUND COVERAGE:  The wound was cleaned with normal saline solution, dried off, Aquaphor ointment was applied, and the wound was covered. A dressing was applied for stabilization and light pressure. The patient was given detailed oral and written instructions on postoperative care. There were no complications.   The patient left the Unit in good medical condition. FOLLOW-UP:  As dissolving sutures were placed, the patient was asked to return if any questions or concerns arose, but otherwise will return to see general dermatology per their instructions.

## 2019-05-14 NOTE — PATIENT INSTRUCTIONS
[facility] Office Hours:    Monday-Thursday  7:30 AM-4:30 PM    Friday  9:00 AM-3:00 PM  POST-OPERATIVE CARE FOR DISSOLVING STICHES             Bandage change after 48 hours    During your procedure today, dissolving sutures, or stiches, were used to close the wound. You do not have to have stiches removed. They will dissolve (melt away) on their own. Please follow these instructions to help you recover from your procedure and help your wound heal.    CARING FOR YOUR SURGICAL SITE  The bandage should remain on and completley dry for 48 hours. Do NOT get the bandage wet. 1. After the first 48 hours, gently remove the remaining part of the bandage. It can be helpful to moisten the bandage edges in the shower. Steri strips may still be on the wound. It is ok, they will fall off slowly with the daily bandage changes. 2. Gently clean AROUND the wound daily with mild soap and water. Please avoid the area from getting wet. The more moisture is on the sutures the quicker they will dissolve. 3. Dry (pat) the area with a clean Q-tip or gauze. Try to clean off any debris or crust from the area. 4. Apply a layer of Vaseline/ Aquaphor (or Bacitracin if your doctor recommends) to the wound area only. 5. Cut a piece of Telfa (or any non-stick dressing) to fit just over the wound and secure it with paper tape. If the wound is small you may use a Band- Aid. Keep area covered for a total of 1 week(s). If the dressing comes off or if you have questions, or concerns about the dressing, please call the office for instructions! POST OPERATIVE INSTRUCTIONS    1. Activity: Do not lift anything heavier than a gallon of milk for 1 week. Also, avoid strenuous activity such as running, power walking or contact sports. 2. Eating and drinking: Do not drink alcohol for 48 hours after your procedure. Alcohol increases the chances of bleeding.   3. Medicines   -If you have discomfort, take Acetaminophen (Tylenol or Extra Strength Tylenol). Follow the instructions and warning on the bottle. -If your doctor has prescribed you an Aspirin daily, please keep taking it. Do not take extra Aspirin or medicines containing Aspirin (such as Gavi-Ennice and Excedrin) for 48 hours  after your procedure. Bleeding: If bleeding occurs, DO NOT remove the bandage. Put firm pressure on the area with gauze for 20 minutes without peeking. If the bleeding continue, apply pressure for another 20 minutes. If the bleeding does not stop after you apply pressure, call us right away. If you can not call, go to the nearest emergency room or urgent care facility. What to expect:  You may have these symptoms. They are normal and should get better with time:  1. Swelling. Swelling usually increases for the first 48 hours after your procedure and then begins to improve. Some soreness and redness around your wound. If we worked close to you eyes  (forehead, nose, temple, or upper cheeks) your eyes may become swollen and/ or black and blue. 2. Bruising, which could last 1 week or more. 3. Pink and bumpy appearance to the scar. This may happen a few weeks after your procedure. After 4 weeks, you may gently massage the area each day with facial moisturizer or petroleum jelly (Vaseline or Aquaphor). This will help to smooth the skin and improve the appearance of the scar. The color of your scar will fade over time, but may be pink for several months after the procedure. The scar may take 6 months to 1 year to reach its final color and appearance. 4. \"Spitting\" suture. Occasionally, an inside suture (stitch) does not completely dissolve. When this happens, (generally 4-8 weeks after surgery), it causes a bump or \"pimple\" to form on the scar. This is easily removed and is not at all serious. It does not mean the skin cancer has returned. Contact us if it happens, but do not be alarmed. Vitamin E oil is NOT necessary.  A good moisturizer is just as effective. Sunscreen IS necessary. Use at least and SPF 30 sunscreen daily- even in winter    Call us at [phone] right away if you have any of the following symptoms:  -Bleeding that you can not stop (see highlighted area above)   -Pain that lasts longer than 48 hours  -Your wound becomes  more painful, red or hot  -Bruising and swelling that does not begin to improve within the 48 hours or gets worse suddenly. Biopsy Wound Care Instructions   Cleanse the wound with mild soapy water daily.  Gently dry the area.  Apply Vaseline or petroleum jelly to the wound using a cotton tipped applicator.  Cover with a clean bandage.  Repeat this process until the biopsy site is healed.  If you had stitches placed, continue treating the site until the stitches are removed. Remember to make an appointment to return to have your stitches removed by our staff.  You may shower and bathe as usual.   ** Biopsy results generally take around 7 business days to come back. If you have not heard from us by then, please call the office at [phone] between [address] [place] [address] Monday through Friday.

## 2019-05-15 ENCOUNTER — TELEPHONE (OUTPATIENT)
Dept: SURGERY | Age: 59
End: 2019-05-15

## 2019-05-15 NOTE — TELEPHONE ENCOUNTER
The patient was in the office 5/14/9 for mohs located on the central forehead with ILC repair. The patient tolerated the procedure well and left the office in good condition. A post-operative telephone call was placed at 12:18pm on 5/15/19 in order to check on the patient's recovery process. The patient reported doing well and had no complaints. All of the patient's questions were answered.

## 2019-05-16 ENCOUNTER — TELEPHONE (OUTPATIENT)
Dept: SURGERY | Age: 59
End: 2019-05-16

## 2019-05-16 NOTE — TELEPHONE ENCOUNTER
----- Message from Morris Harris MD sent at 5/16/2019 10:27 AM EDT -----  Actinic keratosis.   If doesn't resolve with bx pt can see referring dermatologist for further tx

## 2020-09-23 ENCOUNTER — OFFICE VISIT (OUTPATIENT)
Dept: FAMILY MEDICINE CLINIC | Age: 60
End: 2020-09-23
Payer: COMMERCIAL

## 2020-09-23 VITALS
DIASTOLIC BLOOD PRESSURE: 64 MMHG | BODY MASS INDEX: 21.16 KG/M2 | OXYGEN SATURATION: 98 % | WEIGHT: 134.8 LBS | SYSTOLIC BLOOD PRESSURE: 98 MMHG | HEART RATE: 62 BPM | HEIGHT: 67 IN | TEMPERATURE: 97.7 F

## 2020-09-23 PROCEDURE — 99396 PREV VISIT EST AGE 40-64: CPT | Performed by: FAMILY MEDICINE

## 2020-09-23 SDOH — HEALTH STABILITY: MENTAL HEALTH: HOW OFTEN DO YOU HAVE A DRINK CONTAINING ALCOHOL?: 2-3 TIMES A WEEK

## 2020-09-23 SDOH — HEALTH STABILITY: MENTAL HEALTH: HOW MANY STANDARD DRINKS CONTAINING ALCOHOL DO YOU HAVE ON A TYPICAL DAY?: 3 OR 4

## 2020-09-23 ASSESSMENT — PATIENT HEALTH QUESTIONNAIRE - PHQ9
8. MOVING OR SPEAKING SO SLOWLY THAT OTHER PEOPLE COULD HAVE NOTICED. OR THE OPPOSITE, BEING SO FIGETY OR RESTLESS THAT YOU HAVE BEEN MOVING AROUND A LOT MORE THAN USUAL: 0
1. LITTLE INTEREST OR PLEASURE IN DOING THINGS: 0
SUM OF ALL RESPONSES TO PHQ QUESTIONS 1-9: 0
SUM OF ALL RESPONSES TO PHQ QUESTIONS 1-9: 0
6. FEELING BAD ABOUT YOURSELF - OR THAT YOU ARE A FAILURE OR HAVE LET YOURSELF OR YOUR FAMILY DOWN: 0
SUM OF ALL RESPONSES TO PHQ9 QUESTIONS 1 & 2: 0
3. TROUBLE FALLING OR STAYING ASLEEP: 0
10. IF YOU CHECKED OFF ANY PROBLEMS, HOW DIFFICULT HAVE THESE PROBLEMS MADE IT FOR YOU TO DO YOUR WORK, TAKE CARE OF THINGS AT HOME, OR GET ALONG WITH OTHER PEOPLE: 0
2. FEELING DOWN, DEPRESSED OR HOPELESS: 0
9. THOUGHTS THAT YOU WOULD BE BETTER OFF DEAD, OR OF HURTING YOURSELF: 0
5. POOR APPETITE OR OVEREATING: 0
4. FEELING TIRED OR HAVING LITTLE ENERGY: 0
7. TROUBLE CONCENTRATING ON THINGS, SUCH AS READING THE NEWSPAPER OR WATCHING TELEVISION: 0

## 2020-09-23 ASSESSMENT — ENCOUNTER SYMPTOMS
SHORTNESS OF BREATH: 0
EYE PAIN: 0
SORE THROAT: 0
BLOOD IN STOOL: 0
EYE DISCHARGE: 0
BACK PAIN: 0
DIARRHEA: 1
RHINORRHEA: 0
WHEEZING: 0
CONSTIPATION: 0
COLOR CHANGE: 1
ABDOMINAL PAIN: 1

## 2020-09-23 ASSESSMENT — ANXIETY QUESTIONNAIRES
6. BECOMING EASILY ANNOYED OR IRRITABLE: 0-NOT AT ALL
4. TROUBLE RELAXING: 0-NOT AT ALL
3. WORRYING TOO MUCH ABOUT DIFFERENT THINGS: 0-NOT AT ALL
2. NOT BEING ABLE TO STOP OR CONTROL WORRYING: 0-NOT AT ALL
1. FEELING NERVOUS, ANXIOUS, OR ON EDGE: 0-NOT AT ALL
5. BEING SO RESTLESS THAT IT IS HARD TO SIT STILL: 0-NOT AT ALL
GAD7 TOTAL SCORE: 0
7. FEELING AFRAID AS IF SOMETHING AWFUL MIGHT HAPPEN: 0-NOT AT ALL

## 2020-09-23 NOTE — PROGRESS NOTES
SUBJECTIVE:  Chief Complaint   Patient presents with    Annual Exam     pt states that he is here for a yearly physical, is not fasting for bw but will need an order to go to an outside lab for bw     HPI    Jovanna Lamb is a 63 y.o.male that presents today for annual physical exam:    Update since our last OV/annual exam:  Bowel issues:  Loose stools and weight loss 2 years ago. Had c scope with Dr. Shira Bonilla and had barium swallow about 3 years ago. On calendar to have repeat in November 2020  -Changed diet and problems have improved  Not as many digestive stomach issues, more frequently normal stool habits, some diarrhea still  Still can't seem to gain weight back. Still changing diet to see what he can and cannot eat. Recently came across someone that helps finding   Going to see Brett Lui MD  Changed to lectin free diet/plant paradox in past 2-3 years  Doesn't follow it to a T but pretty consistent  Around holidays and times that he eats things that bother his bowels    Wt Readings from Last 3 Encounters:   09/23/20 134 lb 12.8 oz (61.1 kg)   05/14/19 137 lb (62.1 kg)   04/05/19 135 lb 12.8 oz (61.6 kg)     Health Maintenance:  -due for shingles and flu vaccine today.  Will hold off  -will get CMP, LIPID, PSA, A1C, vitamin d, cbc, b12, folate labs    Takes 15 supplements a day:  -follows a lot of Dr. Vieira Schuyler the [facility] physician recommends vitamin d improvement  No hesitation to put most patients on 5,000 IU per day  Takes Vitamin A, Vitamin, C, cinnamon, magnesium 1,440 mg daily  B1, B2, B3, B6, Folic Acid, U09, Biotin, Pantothenic, choline, Calcium, vitamin D3,   Omega 3 1800 mg, fish oil,  Copper, zinc    Past Medical History:   Diagnosis Date    Allergic rhinitis     Cancer ([facility] [address]     skin basal    Chicken pox     Personal history of peanut allergy 8/25/2011    Squamous cell skin cancer, face     -patient with squamous cell cancers on his face, seen by Dr. Connie Pettit his dermatologist, planning to go see MOHS surgeon Dr. Jen Maravilla for this.   -MOHS surgery in past, follows with dermatology Dr. Gregory Russell, on calendar for small cancer on cheek and forehead, has numerous basal cells, squamous cell are the ones he's had done in past    Past Surgical History:   Procedure Laterality Date    HERNIA REPAIR      MOHS SURGERY      VASECTOMY      WISDOM TOOTH EXTRACTION         Family History   Problem Relation Age of Onset    Arthritis Father     Heart Disease Father     High Blood Pressure Father     High Cholesterol Father     Cancer Brother         carcinoids    Diabetes Maternal Grandfather     Diabetes Paternal Grandmother     Cancer Paternal Grandfather     Arthritis Mother      Current Outpatient Medications   Medication Sig Dispense Refill    clobetasol (TEMOVATE) 0.05 % ointment       imiquimod (ALDARA) 5 % cream       EPINEPHrine (EPIPEN 2-SHANEKA) 0.3 MG/0.3ML SOAJ injection INJECT INTO THE MIDDLE OF THE OUTER THIGH AND HOLD FOR 10 SECONDS AS NEEDED FOR SEVERE ALLERGIC REACTION 2 each 1    tadalafil (CIALIS) 5 MG tablet Take 5 mg by mouth as needed for Erectile Dysfunction      fluticasone (FLONASE) 50 MCG/ACT nasal spray 2 sprays by Nasal route daily. 1 Bottle 3    Flaxseed, Linseed, 1000 MG CAPS Take 1 capsule by mouth Daily.  fish oil-omega-3 fatty acids 1000 MG capsule Take 1 g by mouth daily.  multivitamin (THERAGRAN) per tablet Take 1 tablet by mouth daily. No current facility-administered medications for this visit. 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 Financial resource strain: Not hard at all   [employer] [facility] insecurity     Worry: Never true     Inability: Never true   Interneer needs     Medical: No     Non-medical: No   Tobacco Use    Smoking status: Never Smoker    Smokeless tobacco: Never Used   Substance and Sexual Activity    Alcohol use: Yes     Frequency: 2-3 times a week     Drinks per session: 3 or 4     Binge frequency: Never     Comment: social 2-3 x a week    Drug use: No    Sexual activity: Yes     Partners: Female   Lifestyle    Physical activity     Days per week: 6 days     Minutes per session: 60 min    Stress: To some extent   Relationships    Social connections     Talks on phone: Once a week     Gets together: Once a week     Attends Mu-ism service: More than 4 times per year     Active member of club or organization: Yes     Attends meetings of clubs or organizations: Never     Relationship status:     Intimate partner violence     Fear of current or ex partner: No     Emotionally abused: No     Physically abused: No     Forced sexual activity: No   Other Topics Concern    Not on file   Social History Narrative    Wife Corby Aragon healthy         Had something with eye        3 kids: healthy all live out of town        -35 y/o son        -33 y/o son dx with COVID; works as  [employer]        -21 y/o daughter lives in [place], was home for [employer] last year, felt bad last year on Xmas Robina, thought she might have flu, was home for over a week due to illness, in retrospect symptoms were COVID like symptoms.  Tested positive for antibodies       Immunization History   Administered Date(s) Administered    Influenza A (H1T3-25) Vaccine PF IM 12/21/2009    Influenza Virus Vaccine 10/19/2015    Td, unspecified formulation 11/25/1999, 03/19/2009    Tdap (Boostrix, Adacel) 03/19/2009, 04/05/2019       Past medical, surgical, and social history reviewed and updated. Medications, immunizations, and allergies reviewed and updated     Review of Systems   Constitutional: Negative for chills, fever and unexpected weight change. HENT: Negative for congestion, rhinorrhea and sore throat. Eyes: Negative for pain, discharge and visual disturbance. Respiratory: Negative for shortness of breath and wheezing. Cardiovascular: Negative for chest pain and leg swelling. Gastrointestinal: Positive for abdominal pain and diarrhea. Negative for blood in stool and constipation. Endocrine: Negative for polyuria. Genitourinary: Negative for dysuria and flank pain. Musculoskeletal: Negative for arthralgias, back pain and neck pain. Skin: Positive for color change. Negative for rash and wound. Basal and squamous cells in past   Allergic/Immunologic: Negative for environmental allergies, food allergies and immunocompromised state. Neurological: Negative for dizziness, speech difficulty, weakness, light-headedness and headaches. Hematological: Negative for adenopathy. Does not bruise/bleed easily. Psychiatric/Behavioral: Positive for sleep disturbance (can wake up a few times a night some weeks). Negative for dysphoric mood. The patient is not nervous/anxious. OBJECTIVE:  BP 98/64   Pulse 62   Temp 97.7 °F (36.5 °C) (Temporal)   Ht 5' 6.5\" (1.689 m)   Wt 134 lb 12.8 oz (61.1 kg)   SpO2 98%   BMI 21.43 kg/m²     Physical Exam  Constitutional:       General: He is not in acute distress. Appearance: He is well-developed. HENT:      Head: Normocephalic and atraumatic. Right Ear: Tympanic membrane normal.      Left Ear: Tympanic membrane normal.      Nose: Nose normal. No rhinorrhea. Mouth/Throat:      Pharynx: Uvula midline. Eyes:      Pupils: Pupils are equal, round, and reactive to light. Neck:      Trachea: No tracheal deviation. Cardiovascular:      Rate and Rhythm: Normal rate and regular rhythm.       Heart sounds: Normal heart sounds. No murmur. No friction rub. No gallop. Pulmonary:      Effort: Pulmonary effort is normal. No respiratory distress. Breath sounds: Normal breath sounds. No wheezing or rales. Abdominal:      General: Bowel sounds are normal. There is no distension. Palpations: Abdomen is soft. Tenderness: There is no abdominal tenderness. There is no rebound. Musculoskeletal: Normal range of motion. General: No tenderness. Lymphadenopathy:      Cervical: No cervical adenopathy. Skin:     General: Skin is warm and dry. Findings: No erythema or rash. Neurological:      Mental Status: He is alert and oriented to person, place, and time. Cranial Nerves: No cranial nerve deficit. Deep Tendon Reflexes:      Reflex Scores:       Tricep reflexes are 2+ on the right side and 2+ on the left side. Bicep reflexes are 2+ on the right side and 2+ on the left side. Brachioradialis reflexes are 2+ on the right side and 2+ on the left side. Patellar reflexes are 2+ on the right side and 2+ on the left side. Psychiatric:         Speech: Speech normal.         Thought Content: Thought content does not include homicidal or suicidal ideation. ASSESSMENT/PLAN:  David Arriaga is a 71-year-old male who presents today for annual physical exam.  Patient goal of optimizing health, fairly active, healthy diet. Has a follow-up colonoscopy due to family history and personal history of colonic polyps. He is scheduled to do this in November 2020. Lab work today for diabetes, lipid, prostate screening. Additional blood work at patient request.  Declines flu shot for the time being and will consider getting it in the future. Defers shingles shot today. Health maintenance otherwise up-to-date. Will review patient supplement list and ensure that he is not ingesting to much of anyone supplement or vitamin.   Spent extensive length of time reviewing patient bowel health, dietary, lifestyle modifications he has made, also discussed safety and efficacy of flu vaccine with patient. 1. Annual physical exam  -Chart/records reviewed, history and physical performed, health maintenance addressed and updated, presenting problems addressed. - Comprehensive Metabolic Panel; Future  - Lipid Panel; Future  - Hemoglobin A1C; Future  - PSA, Prostatic Specific Antigen; Future  - CBC Auto Differential; Future  - Vitamin D 25 Hydroxy; Future  - MAGNESIUM; Future  - VITAMIN B12 & FOLATE; Future    2. Benign prostatic hyperplasia without urinary obstruction  3. Screening PSA (prostate specific antigen)  - PSA, Prostatic Specific Antigen; Future    4. Family history of colonic polyps  -repeat c scope November 2020 by Dr. Winnifred Boast    5. Lipid screening  - Lipid Panel; Future    6. Diabetes mellitus screening  - Comprehensive Metabolic Panel; Future  - Hemoglobin A1C; Future    7. Screening for deficiency anemia  - CBC Auto Differential; Future    Spent >60 minutes of face to face interaction with patient counseling on diagnoses and treatment plan    Reviewed treatment plan with patient. Patient verbalized understanding to treatment plan and questions were answered. Return in about 1 year (around 9/23/2021). Sai Vaughan.  Kelton Mcrae.      9/23/2020

## 2020-09-25 ENCOUNTER — TELEPHONE (OUTPATIENT)
Dept: FAMILY MEDICINE CLINIC | Age: 60
End: 2020-09-25

## 2020-09-25 RX ORDER — EPINEPHRINE 0.3 MG/.3ML
INJECTION SUBCUTANEOUS
Qty: 2 EACH | Refills: 1 | Status: SHIPPED | OUTPATIENT
Start: 2020-09-25

## 2020-09-25 NOTE — TELEPHONE ENCOUNTER
Pt called requesting new EPINEPHrine (EPIPEN 2-SHANEKA) 0.3 MG/0.3ML SOAJ injection  Old one      [facility] confirmed

## 2020-09-29 DIAGNOSIS — Z13.0 SCREENING FOR DEFICIENCY ANEMIA: ICD-10-CM

## 2020-09-29 DIAGNOSIS — Z13.1 DIABETES MELLITUS SCREENING: ICD-10-CM

## 2020-09-29 DIAGNOSIS — Z13.220 LIPID SCREENING: ICD-10-CM

## 2020-09-29 DIAGNOSIS — Z12.5 SCREENING PSA (PROSTATE SPECIFIC ANTIGEN): ICD-10-CM

## 2020-09-29 DIAGNOSIS — N40.0 BENIGN PROSTATIC HYPERPLASIA WITHOUT URINARY OBSTRUCTION: ICD-10-CM

## 2020-09-29 DIAGNOSIS — Z00.00 ANNUAL PHYSICAL EXAM: ICD-10-CM

## 2020-09-29 LAB
A/G RATIO: 2.4 (ref 1.1–2.2)
ALBUMIN SERPL-MCNC: 4.4 G/DL (ref 3.4–5)
ALP BLD-CCNC: 48 U/L (ref 40–129)
ALT SERPL-CCNC: 24 U/L (ref 10–40)
ANION GAP SERPL CALCULATED.3IONS-SCNC: 12 MMOL/L (ref 3–16)
AST SERPL-CCNC: 31 U/L (ref 15–37)
BASOPHILS ABSOLUTE: 0 K/UL (ref 0–0.2)
BASOPHILS RELATIVE PERCENT: 0.6 %
BILIRUB SERPL-MCNC: 0.6 MG/DL (ref 0–1)
BUN BLDV-MCNC: 14 MG/DL (ref 7–20)
CALCIUM SERPL-MCNC: 9.6 MG/DL (ref 8.3–10.6)
CHLORIDE BLD-SCNC: 103 MMOL/L (ref 99–110)
CHOLESTEROL, TOTAL: 217 MG/DL (ref 0–199)
CO2: 25 MMOL/L (ref 21–32)
CREAT SERPL-MCNC: 1.1 MG/DL (ref 0.8–1.3)
EOSINOPHILS ABSOLUTE: 0.2 K/UL (ref 0–0.6)
EOSINOPHILS RELATIVE PERCENT: 3.5 %
FOLATE: 18.59 NG/ML (ref 4.78–24.2)
GFR AFRICAN AMERICAN: >60
GFR NON-AFRICAN AMERICAN: >60
GLOBULIN: 1.8 G/DL
GLUCOSE BLD-MCNC: 94 MG/DL (ref 70–99)
HCT VFR BLD CALC: 43.3 % (ref 40.5–52.5)
HDLC SERPL-MCNC: 98 MG/DL (ref 40–60)
HEMOGLOBIN: 14.4 G/DL (ref 13.5–17.5)
LDL CHOLESTEROL CALCULATED: 108 MG/DL
LYMPHOCYTES ABSOLUTE: 1.7 K/UL (ref 1–5.1)
LYMPHOCYTES RELATIVE PERCENT: 33.2 %
MAGNESIUM: 2.2 MG/DL (ref 1.8–2.4)
MCH RBC QN AUTO: 31.1 PG (ref 26–34)
MCHC RBC AUTO-ENTMCNC: 33.2 G/DL (ref 31–36)
MCV RBC AUTO: 93.7 FL (ref 80–100)
MONOCYTES ABSOLUTE: 0.4 K/UL (ref 0–1.3)
MONOCYTES RELATIVE PERCENT: 7.5 %
NEUTROPHILS ABSOLUTE: 2.9 K/UL (ref 1.7–7.7)
NEUTROPHILS RELATIVE PERCENT: 55.2 %
PDW BLD-RTO: 12.9 % (ref 12.4–15.4)
PLATELET # BLD: 188 K/UL (ref 135–450)
PMV BLD AUTO: 8.7 FL (ref 5–10.5)
POTASSIUM SERPL-SCNC: 4.5 MMOL/L (ref 3.5–5.1)
PROSTATE SPECIFIC ANTIGEN: 0.63 NG/ML (ref 0–4)
RBC # BLD: 4.62 M/UL (ref 4.2–5.9)
SODIUM BLD-SCNC: 140 MMOL/L (ref 136–145)
TOTAL PROTEIN: 6.2 G/DL (ref 6.4–8.2)
TRIGL SERPL-MCNC: 55 MG/DL (ref 0–150)
VITAMIN B-12: 1037 PG/ML (ref 211–911)
VITAMIN D 25-HYDROXY: 60.2 NG/ML
VLDLC SERPL CALC-MCNC: 11 MG/DL
WBC # BLD: 5.2 K/UL (ref 4–11)

## 2020-09-30 LAB
ESTIMATED AVERAGE GLUCOSE: 114 MG/DL
HBA1C MFR BLD: 5.6 %

## 2020-11-03 ENCOUNTER — HOSPITAL ENCOUNTER (OUTPATIENT)
Dept: PHYSICAL THERAPY | Age: 60
Setting detail: THERAPIES SERIES
Discharge: HOME | End: 2020-11-03
Payer: COMMERCIAL

## 2020-11-03 PROCEDURE — 97140 MANUAL THERAPY 1/> REGIONS: CPT | Performed by: PHYSICAL THERAPIST

## 2020-11-03 PROCEDURE — 97161 PT EVAL LOW COMPLEX 20 MIN: CPT | Performed by: PHYSICAL THERAPIST

## 2020-11-03 PROCEDURE — 97110 THERAPEUTIC EXERCISES: CPT | Performed by: PHYSICAL THERAPIST

## 2020-11-04 NOTE — FLOWSHEET NOTE
[facility] Orthopaedics and [address] [place] [address]; [place]         Physical Therapy Treatment Note/ Progress Report:           Date:  11/3/2020    Patient Name:  Zoie Islas    :  1960  MRN: [id number]  Restrictions/Precautions:    Medical/Treatment Diagnosis Information:  · Diagnosis: M25.511 (ICD-10-CM) - Pain in right shoulder  · Treatment Diagnosis: M25.511 (ICD-10-CM) - Pain in right shoulder  Insurance/Certification information:     Physician Information:  Referring Practitioner: dr Jessika Carlton  Has the plan of care been signed (Y/N):        []  Yes  [x]  No     Date of Patient follow up with Physician:       Is this a Progress Report:     []  Yes  [x]  No        If Yes:  Date Range for reporting period:  Beginning  Ending    Progress report will be due (10 Rx or 30 days whichever is less):        Recertification will be due (POC Duration  / 90 days whichever is less):         Visit # Insurance Allowable Auth Required   1  []  Yes []  No        Functional Scale:    Date assessed:       Latex Allergy:  [x]NO      []YES  Preferred Language for Healthcare:   [x]English       []other:      Pain level:  7/10     SUBJECTIVE:  See eval    OBJECTIVE: See eval   Observation:    Test measurements:      RESTRICTIONS/PRECAUTIONS:      Exercises/Interventions:       Therapeutic Ex (86110) Sets/sec Reps Notes/CUES   Sleeper stretch   5x     Serratus punches   30x    sidelying er   25x    Prone: m trap, l trap   25x    Wall stretch   4 x 10\"     Horizontal pec stretch   4 x 15\"                Education   7'                       Manual Intervention (52952)      Prom/stm   15'                                                                                               Therapeutic Activity (09770)                                              Therapeutic Exercise and NMR EXR  [x] (81410) Provided verbal/tactile cueing for activities related to strengthening, flexibility, endurance, ROM for improvements in LE, proximal hip, and core control with self care, mobility, lifting, ambulation. [x] (75223) Provided verbal/tactile cueing for activities related to improving balance, coordination, kinesthetic sense, posture, motor skill, proprioception to assist with LE, proximal hip, and core control in self-care, mobility, lifting, ambulation and eccentric single leg control. NMR and Therapeutic Activities:    [x] (69274 or 18938) Provided verbal/tactile cueing for activities related to improving balance, coordination, kinesthetic sense, posture, motor skill, proprioception and motor activation to allow for proper function of core, proximal hip and LE with self-care and ADLs and functional mobility.   [] (15871) Gait Re-education- Provided training and instruction to the patient for proper LE, core and proximal hip recruitment and positioning and eccentric body weight control with ambulation re-education including up and down stairs     Home Exercise Program:    [x] (55947) Reviewed/Progressed HEP activities related to strengthening, flexibility, endurance, ROM of core, proximal hip and LE for functional self-care, mobility, lifting and ambulation/stair navigation   [] (62504) Reviewed/Progressed HEP activities related to improving balance, coordination, kinesthetic sense, posture, motor skill, proprioception of core, proximal hip and LE for self-care, mobility, lifting, and ambulation/stair navigation      Manual Treatments:  PROM / STM / Oscillations-Mobs:  G-I, II, III, IV (PA's, Inf., Post.)  [x] (65562) Provided manual therapy to mobilize LE, proximal hip and/or LS spine soft tissue/joints for the purpose of modulating pain, promoting relaxation, increasing ROM, reducing/eliminating soft tissue swelling/inflammation/restriction, improving soft tissue extensibility and allowing for proper ROM for normal function with self-care, mobility, lifting and ambulation. Modalities:     [] GAME READY (VASO)- for significant edema, swelling, pain control. Charges:  Timed Code Treatment Minutes: 35'    Total Treatment Minutes: 65         [x] EVAL (LOW) 27904 (typically 20 minutes face-to-face)  [] EVAL (MOD) 59492 (typically 30 minutes face-to-face)  [] EVAL (HIGH) 04267 (typically 45 minutes face-to-face)  [] RE-EVAL     [x] QW(44323) x  1   [] IONTO  [] NMR (01542) x     [] VASO  [x] Manual (00067) x 1    [] Other:  [] TA x      [] Mech Traction (48548)  [] ES(attended) (70209)      [] ES (un) (00182):         ASSESSMENT:  See eval      GOALS:   Patient stated goal:   [] Progressing: [] Met: [] Not Met: [] Adjusted    Therapist goals for Patient:   Short Term Goals: To be achieved in: 2 weeks  1. Independent in HEP and progression per patient tolerance, in order to prevent re-injury. [] Progressing: [] Met: [] Not Met: [] Adjusted   2. Patient will have a decrease in pain to facilitate improvement in movement, function, and ADLs as indicated by Functional Deficits. [] Progressing: [] Met: [] Not Met: [] Adjusted    Long Term Goals: To be achieved in:   8  weeks  - The patient is expected to demonstrate less than  8% impairment, limitation or restriction in:  - carrying, moving and handling objects. - Patient will demonstrate increased passive and active ROM to full, symmetrical and painfree to allow for proper joint functioning as indicated by patients Functional Deficits. [] Progressing: [] Met: [] Not Met: [] Adjusted    - Patient will demonstrate an increase in Strength to symmetrical and painfree to allow for proper functional mobility as indicated by patients Functional Deficits. [] Progressing: [] Met: [] Not Met: [] Adjusted    - Patient will return to desired, higher level upper extremity functional activities without increased symptoms or restriction.       [] Progressing: [] Met: [] Not Met: [] Adjusted                Overall Progression Towards Functional goals/ Treatment Progress Update:  [] Patient is progressing as expected towards functional goals listed. [] Progression is slowed due to complexities/Impairments listed. [] Progression has been slowed due to co-morbidities. [x] Plan just implemented, too soon to assess goals progression <30days   [] Goals require adjustment due to lack of progress  [] Patient is not progressing as expected and requires additional follow up with physician  [] Other    Prognosis for POC: [x] Good [] Fair  [] Poor      Patient requires continued skilled intervention: [x] Yes  [] No    Treatment/Activity Tolerance:  [x] Patient able to complete treatment  [] Patient limited by fatigue  [] Patient limited by pain    [] Patient limited by other medical complications  [] Other:         Return to Play: (if applicable)   []  Stage 1: Intro to Strength   []  Stage 2: Return to Run and Strength   []  Stage 3: Return to Jump and Strength   []  Stage 4: Dynamic Strength and Agility   []  Stage 5: Sport Specific Training     []  Ready to Return to Play, Meets All Above Stages   []  Not Ready for Return to Sports   Comments:                               PLAN: See eval  [] Continue per plan of care [] Alter current plan (see comments above)  [x] Plan of care initiated [] Hold pending MD visit [] Discharge      Electronically signed by:  Royer Wilson, PT, MPT     Note: If patient does not return for scheduled/ recommended follow up visits, this note will serve as a discharge from care along with most recent update on progress.

## 2020-11-04 NOTE — PLAN OF CARE
Functional Disability Index  36% uefi     Pain Scale: 6-7/10    Easing factors: rest     Provocative factors: usage/    Night Pain:    - complained of night pain associated with sleep position. Type:   - Constant          Paresthesia complaints:   - no paresthesia complaints       Functional Limitations/Impairments: []  - Lifting  - reaching   - Grooming   - Carrying      - ADL's   - Driving  - Sports/Recreation activity      Occupation/School:   - retired    - domestic    - labor intensive occupation   - sedentary occupation   - high school student    - college student   - unemployed   - on disability   - other      Living Status/Prior Level of Function: This patient was independent in ADL's and IADL's prior to onset of symptoms. PACEMAKER:  - Denied having a pacemaker that would contraindicate the use of electrical modalities. -  METAL IMPLANTS:  - Denied metal implants that would contraindicate the use of thermal modalities. -  CANCER HISTORY:  - Denied a history of cancer that would contraindicate thermal modalities. .    OBJECTIVE:     ROM:   CERVICAL    SHOULDER    Joint Mobility:     Strength/Neuromuscular control:    Dermatomal Sensation:  - Dermatomal sensation was intact to light touch bilaterally throughout upper and lower extremities. - Dermatomal sensation loss was found in the following dermatomal pattern. []L1  []L2 []L3  []L4 []L5  []S1   []C1 []C2 []C3 []C4[]C5 []C6 []C7 []C8 []T1     Deep tendon reflexes:  - DTR's were symmetrical and intact throughout. Palpation: + fwd head/kyphosis     Functional Mobility/Transfers:     Posture: + fwd head/kyphosis       Bandages/Dressings/Incisions:     Gait:     Orthopedic Special Tests:                           [x] Patient history, allergies, meds reviewed. Medical chart reviewed. See intake form.      Review Of Systems (ROS):  [x]Performed Review of systems (Integumentary, CardioPulmonary, Neurological) by intake and observation. Intake form has been scanned into medical record. Patient has been instructed to contact their primary care physician regarding ROS issues if not already being addressed at this time. Objective Review of Systems:  Cognitive, Communication, Behavior and Learning:  - The patient was alert, spoke coherently and was oriented to person, place and time. - Demonstrated no barriers to communication or processing information.  - Appeared literate, spoke [place] and had no significant hearing or vision impairment. - Had no abnormal behavioral responses, learning preferences or educational needs. Cardiopulmonary:  Edema:       Integumentary:  Nail beds:    Skin appearance:      MEDICARE CAP EXCEPTION DOCUMENTATION  I certify that this patient meets one of the below criteria necessary for becoming an exception to the Medicare cap on therapy services:  []  The patient has a condition that has a direct and significant impact on the need for therapy. (Significantly impacts the rate of recovery)  []  The patient has a complexity that has a direct and significant impact on the need for therapy. (Significantly impacts the rate of recovery and is associated with a primary condition.)       []  The patient has associated variables that influence the amount of treatment to include:  Social support, self-efficacy/motivation, prognosis, time since onset/acuity. []  The patient has generalized musculoskeletal conditions or a condition affecting multiple sites that will have a direct impact on the rate of recovery. []  The patient had a prior episode of outpatient therapy during this calendar year for a different condition. []  The patient has a mental or cognitive disorder in addition to the condition being treated that will have a direct and significant impact on the rate of recovery. Falls Risk Assessment (30 days):   [] Falls Risk assessed and no intervention required.   [] Falls Risk assessed and Patient requires intervention due to being higher risk   TUG score (>12s at risk):     [] Falls education provided, including       G-Codes:       ASSESSMENT:    Functional Impairments   [x]Noted spinal or UE joint hypomobility   [x]Noted spinal or UE joint hypermobility   [x]Decreased UE functional ROM   [x]Decreased UE functional strength   [x]Abnormal reflexes/sensation/myotomal/dermatomal deficits   [x]Decreased RC/scapular/core strength and neuromuscular control   []other:      Functional Activity Limitations (from functional questionnaire and intake)   [x]Reduced ability to tolerate prolonged functional positions   [x]Reduced ability or difficulty with changes of positions or transfers between positions   [x]Reduced ability to maintain good posture and demonstrate good body mechanics with sitting, bending, and lifting   [x] Reduced ability or tolerance with driving and/or computer work   [x]Reduced ability to sleep   [x]Reduced ability to perform lifting, reaching, carrying tasks   [x]Reduced ability to tolerate impact through UE   [x]Reduced ability to reach behind back   [x]Reduced ability to  or hold objects   [x]Reduced ability to throw or toss an object    Participation Restrictions   [x]Reduced participation in self care activities   [x]Reduced participation in home management activities   [x]Reduced participation in work activities   [x]Reduced participation in social activities. [x]Reduced participation in sport activities. Classification :  signs/symptoms consistent with post-surgical status including decreased ROM, strength and function. - impaired joint mobility, motor function, muscle performance and range of motion associated with:  - ligament or other connective tissue dysfunction. (Pattern 4D)  - localized inflammation.  (Pattern 4E)    Prognosis/Rehab Potential:       - Good     Factors affecting rehab potential:  -- associated co-morbidities    Tolerance of evaluation/treatment:     - Good     Physical Therapy Evaluation Complexity Justification  [x] A history of present problem with:  [x] no personal factors and/or comorbidities that impact the plan of care;  []1-2 personal factors and/or comorbidities that impact the plan of care  []3 personal factors and/or comorbidities that impact the plan of care  [x] An examination of body systems using standardized tests and measures addressing any of the following: body structures and functions (impairments), activity limitations, and/or participation restrictions;:  [x] a total of 1-2 or more elements   [] a total of 3 or more elements   [] a total of 4 or more elements   [x] A clinical presentation with:  [] stable and/or uncomplicated characteristics   [x] evolving clinical presentation with changing characteristics  [] unstable and unpredictable characteristics;   [x] Clinical decision making of [x] low, [] moderate, [] high complexity using standardized patient assessment instrument and/or measurable assessment of functional outcome.     [x] EVAL (LOW) 39316 (typically 30 minutes face-to-face)  [] EVAL (MOD) 09917 (typically 30 minutes face-to-face)  [] EVAL (HIGH) 33029 (typically 45 minutes face-to-face)  [] RE-EVAL         Co-morbidities/Complexities (which will affect course of rehabilitation):   []None           Arthritic conditions   []Rheumatoid arthritis (M05.9)  [x]Osteoarthritis (M19.91)   Cardiovascular conditions   []Hypertension (I10)  []Hyperlipidemia (E78.5)  []Angina pectoris (I20)  []Atherosclerosis (I70)   Musculoskeletal conditions   []Disc pathology   []Congenital spine pathologies   []Prior surgical intervention  []Osteoporosis (M81.8)  []Osteopenia (M85.8)   Endocrine conditions   []Hypothyroid (E03.9)  []Hyperthyroid Gastrointestinal conditions   []Constipation (X91.61)   Metabolic conditions   []Morbid obesity (E66.01)  []Diabetes type 1(E10.65) or 2 (E11.65)   []Neuropathy (G60.9)     Pulmonary conditions   []Asthma (J45)  []Coughing   []COPD (J44.9)   Psychological Disorders  []Anxiety (F41.9)  []Depression (F32.9)   []Other:   [x]Other:    - CA           PLAN:  Frequency/Duration:  2 days per week for 8  Weeks:  INTERVENTIONS:  1. Therapeutic exercise including: strength training, ROM, NMR and proprioception for the scapula, core and Upper extremity  2. Manual therapy as indicated including Dry Needling/IASTM, STM, PROM, Gr I-IV mobilizations, spinal mobilization/manipulation. 3. Modalities as needed including: thermal agents, E-stim, US, iontophoresis as indicated. 4. Patient education on joint protection, activity modification, progression of HEP. HEP instruction: sleeper stretch, punches, wall stretches, prone h abd, l trap, sidelying er     GOALS:  Patient stated goal: \"less pain and more strength\"    Therapist goals for Patient:   Short Term Goals: To be achieved in: 2 weeks  - Independent in HEP and progression per patient tolerance, in order to prevent re-injury. -  Patient will have a decrease in pain to facilitate improvement in movement, function, and ADLs as indicated by Functional Deficits. Long Term Goals: To be achieved in:8  weeks  - The patient is expected to demonstrate less than  8% impairment, limitation or restriction in:  - carrying, moving and handling objects. - Patient will demonstrate increased passive and active ROM to full, symmetrical and painfree to allow for proper joint functioning as indicated by patients Functional Deficits. - Patient will demonstrate an increase in Strength to symmetrical and painfree to allow for proper functional mobility as indicated by patients Functional Deficits. - Patient will return to desired, higher level upper extremity functional activities without increased symptoms or restriction.       Electronically signed by:  Holger Rosas, PT, MPT

## 2020-11-05 ENCOUNTER — TELEPHONE (OUTPATIENT)
Dept: SURGERY | Age: 60
End: 2020-11-05

## 2020-11-05 NOTE — TELEPHONE ENCOUNTER
The patient called cancelling his Mohs appt on 11/9 due to running a fever. What's the next step for him to be rescheduled.

## 2020-11-06 ENCOUNTER — TELEPHONE (OUTPATIENT)
Dept: FAMILY MEDICINE CLINIC | Age: 60
End: 2020-11-06

## 2020-11-09 ENCOUNTER — NURSE ONLY (OUTPATIENT)
Dept: PRIMARY CARE CLINIC | Age: 60
End: 2020-11-09
Payer: COMMERCIAL

## 2020-11-09 PROCEDURE — 99211 OFF/OP EST MAY X REQ PHY/QHP: CPT | Performed by: NURSE PRACTITIONER

## 2020-11-09 NOTE — PROGRESS NOTES
[place]  received a viral test for COVID-19. They were educated on isolation and quarantine as appropriate. For any symptoms, they were directed to seek care from their PCP, given contact information to establish with a doctor, directed to an urgent care or the emergency room.

## 2020-11-11 ENCOUNTER — VIRTUAL VISIT (OUTPATIENT)
Dept: FAMILY MEDICINE CLINIC | Age: 60
End: 2020-11-11
Payer: COMMERCIAL

## 2020-11-11 ENCOUNTER — TELEPHONE (OUTPATIENT)
Dept: FAMILY MEDICINE CLINIC | Age: 60
End: 2020-11-11

## 2020-11-11 LAB — SARS-COV-2, NAA: DETECTED

## 2020-11-11 PROCEDURE — 99213 OFFICE O/P EST LOW 20 MIN: CPT | Performed by: PHYSICIAN ASSISTANT

## 2020-11-11 PROCEDURE — 3017F COLORECTAL CA SCREEN DOC REV: CPT | Performed by: PHYSICIAN ASSISTANT

## 2020-11-11 PROCEDURE — G8427 DOCREV CUR MEDS BY ELIG CLIN: HCPCS | Performed by: PHYSICIAN ASSISTANT

## 2020-11-11 NOTE — TELEPHONE ENCOUNTER
hydroxychloroquine is not indicated. Recent studies show this medicine is not effective on COVID. The other meds used for COVID are effective only to those in ICU and on ventilators.

## 2020-11-11 NOTE — TELEPHONE ENCOUNTER
Patient tested positive for COVID and would like to know if he is a candidate for taking Hydroxychlorquine?

## 2020-11-11 NOTE — TELEPHONE ENCOUNTER
Pt called in with fever, congestion, body ache, chills, fatigue for a week now. [facility] has not gotten the results back yet. Would like to be seen, [facility]?

## 2020-11-11 NOTE — PROGRESS NOTES
20     TELEHEALTH EVALUATION -- Audio/Visual (During SZXYQ-95 public health emergency)      HPI:  Chief Complaint   Patient presents with    Other     fever, body aches, congested, for about 7 days. tested for covid on  2 days ago results in chart [address] (:  1960) has requested an audio/video evaluation for the following concern(s):   as per chief complaint. Began 7  days ago with fever, myalgias/arthralgias, headache, nasal congestion, facial pain/sinus pressure, cough and poor appetite. Temp daily from 100-102 F x 6 days. Sick contacts are  Wife with COVID, too but not as severe. Remedies tried are Tylenol. Denies shortness of breath, wheezing/chest tightness, purulent sputum and rash. ROS:  Remaining 14 ROS were reviewed and are unremarkable for other constitutional, EENT, cardiac, pulmonary, GI, , neurologic, musculoskeletal, or integumentary complaints. Prior to Visit Medications    Medication Sig Taking? Authorizing Provider   EPINEPHrine (EPIPEN 2-SHANEKA) 0.3 MG/0.3ML SOAJ injection INJECT INTO THE MIDDLE OF THE OUTER THIGH AND HOLD FOR 10 SECONDS AS NEEDED FOR SEVERE ALLERGIC REACTION Yes Angel Guillen. Manolo Plunk., DO   clobetasol (TEMOVATE) 0.05 % ointment  Yes Historical Provider, MD   imiquimod (ALDARA) 5 % cream  Yes Historical Provider, MD   fluticasone (FLONASE) 50 MCG/ACT nasal spray 2 sprays by Nasal route daily. Yes Carl Wilkins MD   Flaxseed, Linseed, 1000 MG CAPS Take 1 capsule by mouth Daily. Yes Historical Provider, MD   fish oil-omega-3 fatty acids 1000 MG capsule Take 1 g by mouth daily. Yes Historical Provider, MD   multivitamin [facility]) per tablet Take 1 tablet by mouth daily.  Yes Historical Provider, MD   tadalafil (CIALIS) 5 MG tablet Take 5 mg by mouth as needed for Erectile Dysfunction  Historical Provider, MD       Social History     Tobacco Use    Smoking status: Never Smoker    Smokeless tobacco: Never Used   Substance Use Topics    Alcohol use: Yes     Frequency: 2-3 times a week     Drinks per session: 3 or 4     Binge frequency: Never     Comment: social 2-3 x a week    Drug use: No        Allergies   Allergen Reactions    Peanut-Containing Drug Products Anaphylaxis    Penicillins     Sulfa Antibiotics     Peanuts [Peanut Oil]    ,   Past Medical History:   Diagnosis Date    Allergic rhinitis     Cancer ([facility] [address]     skin basal    Chicken pox     Personal history of peanut allergy 8/25/2011    Squamous cell skin cancer, face    ,   Past Surgical History:   Procedure Laterality Date    HERNIA REPAIR      MOHS SURGERY      VASECTOMY      WISDOM TOOTH EXTRACTION     ,   Social History     Tobacco Use    Smoking status: Never Smoker    Smokeless tobacco: Never Used   Substance Use Topics    Alcohol use: Yes     Frequency: 2-3 times a week     Drinks per session: 3 or 4     Binge frequency: Never     Comment: social 2-3 x a week    Drug use: No       PHYSICAL EXAMINATION:    Patient-Reported Vitals 11/11/2020   Patient-Reported Weight 135.0 lb   Patient-Reported Height 5 6.5   Patient-Reported Temperature 101.3 this morning took cold shower down to 100.0         Constitutional: [x] Appears well-developed and well-nourished [x] No apparent distress      [x] Abnormal- looks ill, non toxic    Mental status  [x] Alert and awake  [x] Oriented to person/place/time [x]Able to follow commands      Eyes:  EOM    [x]  Normal  [] Abnormal-  Sclera  [x]  Normal  [] Abnormal -         Discharge [x]  None visible  [] Abnormal -    HENT:   [x] Normocephalic, atraumatic.   [] Abnormal   [x] Mouth/Throat: Mucous membranes are moist.     External Ears [x] Normal  [] Abnormal-     Neck: [x] No visualized mass     Pulmonary/Chest: [x] Respiratory effort normal.  [x] No visualized signs of difficulty breathing or respiratory distress        [] Abnormal-      Musculoskeletal:   [] Normal gait with no signs of ataxia         [x] Normal range of motion of neck        [] Abnormal-       Neurological:        [x] No Facial Asymmetry (Cranial nerve 7 motor function) (limited exam to video visit)          [x] No gaze palsy        [] Abnormal-         Skin:        [x] No significant exanthematous lesions or discoloration noted on facial skin         [] Abnormal-            Psychiatric:       [x] Normal Affect [x] No Hallucinations        [] Abnormal-     Other pertinent observable physical exam findings-     ASSESSMENT/PLAN:  1. COVID-19 POSITIVE  2. Malaise and fatigue  Rest, increase fluids, suggested Day/Nyquil. Tylenol for fever. Can use small amt of ibuprofen. Take baby ASA per day. Follow Up prmadina Jordan is a 63 y.o. male being evaluated by a Virtual Visit (video visit) encounter to address concerns as mentioned above. A caregiver was present when appropriate. Due to this being a TeleHealth encounter (During Z-40 public health emergency), evaluation of the following organ systems was limited: Vitals/Constitutional/EENT/Resp/CV/GI//MS/Neuro/Skin/Heme-Lymph-Imm. Pursuant to the emergency declaration under the [address] [place] [address], [address] [place] [address] authority and the [employer] Act, this Virtual Visit was conducted with patient's (and/or legal guardian's) consent, to reduce the patient's risk of exposure to COVID-19 and provide necessary medical care. The patient (and/or legal guardian) has also been advised to contact this office for worsening conditions or problems, and seek emergency medical treatment and/or call 911 if deemed necessary. Patient identification was verified at the start of the visit: Yes    Total time spent on this encounter: 30    Services were provided through a video synchronous discussion virtually to substitute for in-person clinic visit. Patient and provider were located at their individual homes.     --KASIA Quevedo on 11/11/2020 at 12:00 PM    An electronic signature was used to authenticate this note.

## 2020-11-11 NOTE — TELEPHONE ENCOUNTER
Routing to Guy Trinh who saw pt today. I will STOP taking the medications listed below when I get home from the hospital:    propranolol 20 mg oral tablet  -- 1 tab(s) by mouth once a day at 10AM

## 2020-11-13 ENCOUNTER — TELEPHONE (OUTPATIENT)
Dept: FAMILY MEDICINE CLINIC | Age: 60
End: 2020-11-13

## 2020-11-13 NOTE — TELEPHONE ENCOUNTER
Pt called in to f/u about appt with [facility]. Wanting to know if there is any recommendations for congestion and why he was not precribed and antibiotic for treatment. Is not feeling any better, has the same symptoms and congestion has gotten worse. Please advise.

## 2020-11-13 NOTE — TELEPHONE ENCOUNTER
Routing to Mckenna Carmona. There was a previous encounter on 11/11/20 where he was advised of [place]'s response.

## 2020-11-16 NOTE — TELEPHONE ENCOUNTER
Called patient to schedule appt, congestion has gotten better. Is not interested in scheduling an appt now, said it's been three days to get a call back.

## 2020-11-23 NOTE — TELEPHONE ENCOUNTER
The patient tested Postive for Covid on 11/9 he is now asymptomatic and feels well. He's been rescheduled for Mohs on 1/12/21 at 8:00 a.m.

## 2021-01-12 ENCOUNTER — PROCEDURE VISIT (OUTPATIENT)
Dept: SURGERY | Age: 61
End: 2021-01-12
Payer: COMMERCIAL

## 2021-01-12 VITALS — DIASTOLIC BLOOD PRESSURE: 72 MMHG | HEART RATE: 71 BPM | TEMPERATURE: 97.3 F | SYSTOLIC BLOOD PRESSURE: 121 MMHG

## 2021-01-12 DIAGNOSIS — D04.39 SQUAMOUS CELL CARCINOMA IN SITU (SCCIS) OF SKIN OF RIGHT CHEEK: Primary | ICD-10-CM

## 2021-01-12 PROCEDURE — 12051 INTMD RPR FACE/MM 2.5 CM/<: CPT | Performed by: DERMATOLOGY

## 2021-01-12 PROCEDURE — 17311 MOHS 1 STAGE H/N/HF/G: CPT | Performed by: DERMATOLOGY

## 2021-01-12 NOTE — PATIENT INSTRUCTIONS
[facility] Office Hours:    Monday-Thursday  7:30 AM-4:30 PM    Friday  9:00 AM-1:00 PM      POST-OPERATIVE CARE FOR DISSOLVING STICHES             Bandage change after 48 hours    During your procedure today, dissolving sutures, or stiches, were used to close the wound. You do not have to have stiches removed. They will dissolve (melt away) on their own. Please follow these instructions to help you recover from your procedure and help your wound heal.    CARING FOR YOUR SURGICAL SITE  The bandage should remain on and completley dry for 48 hours. Do NOT get the bandage wet. 1. After the first 48 hours, gently remove the remaining part of the bandage. It can be helpful to moisten the bandage edges in the shower. Steri strips may still be on the wound. It is ok, they will fall off slowly with the daily bandage changes. 2. Gently clean AROUND the wound daily with mild soap and water. Please avoid the area from getting wet. The more moisture is on the sutures the quicker they will dissolve. 3. Dry (pat) the area with a clean Q-tip or gauze. Try to clean off any debris or crust from the area. 4. Apply a layer of Vaseline/ Aquaphor (or Bacitracin if your doctor recommends) to the wound area only. 5. Cut a piece of Telfa (or any non-stick dressing) to fit just over the wound and secure it with paper tape. If the wound is small you may use a Band- Aid. Keep area covered for a total of 1 week(s). If the dressing comes off or if you have questions, or concerns about the dressing, please call the office for instructions! POST OPERATIVE INSTRUCTIONS    1. Activity: Do not lift anything heavier than a gallon of milk for 1 week. Also, avoid strenuous activity such as running, power walking or contact sports. 2. Eating and drinking: Do not drink alcohol for 48 hours after your procedure. Alcohol increases the chances of bleeding.   3. Medicines   -If you have discomfort, take Acetaminophen (Tylenol or Extra Strength Tylenol). Follow the instructions and warning on the bottle. -If your doctor has prescribed you an Aspirin daily, please keep taking it. Do not take extra Aspirin or medicines containing Aspirin (such as Gavi-Dulac and Excedrin) for 48 hours  after your procedure. Bleeding: If bleeding occurs, DO NOT remove the bandage. Put firm pressure on the area with gauze for 20 minutes without peeking. If the bleeding continue, apply pressure for another 20 minutes. If the bleeding does not stop after you apply pressure, call us right away. If you can not call, go to the nearest emergency room or urgent care facility. What to expect:  You may have these symptoms. They are normal and should get better with time:  1. Swelling. Swelling usually increases for the first 48 hours after your procedure and then begins to improve. Some soreness and redness around your wound. If we worked close to you eyes  (forehead, nose, temple, or upper cheeks) your eyes may become swollen and/ or black and blue. 2. Bruising, which could last 1 week or more. 3. Pink and bumpy appearance to the scar. This may happen a few weeks after your procedure. After 4 weeks, you may gently massage the area each day with facial moisturizer or petroleum jelly (Vaseline or Aquaphor). This will help to smooth the skin and improve the appearance of the scar. The color of your scar will fade over time, but may be pink for several months after the procedure. The scar may take 6 months to 1 year to reach its final color and appearance. 4. \"Spitting\" suture. Occasionally, an inside suture (stitch) does not completely dissolve. When this happens, (generally 4-8 weeks after surgery), it causes a bump or \"pimple\" to form on the scar. This is easily removed and is not at all serious. It does not mean the skin cancer has returned. Contact us if it happens, but do not be alarmed. Vitamin E oil is NOT necessary.  A good moisturizer is just as effective. Sunscreen IS necessary. Use at least and SPF 30 sunscreen daily- even in winter    Call us at [phone] right away if you have any of the following symptoms:  -Bleeding that you can not stop (see highlighted area above)   -Pain that lasts longer than 48 hours  -Your wound becomes  more painful, red or hot  -Bruising and swelling that does not begin to improve within the 48 hours or gets worse suddenly.

## 2021-01-12 NOTE — PROGRESS NOTES
MOHS PROCEDURE NOTE    PHYSICIAN:  Joon Zavala. Mary Moy MD    ASSISTANT: Qamar Carter LPN, Garry Aase, RN     REFERRING PROVIDER:  Adelina Ramos MD    PREOPERATIVE DIAGNOSIS: Squamous Cell Carcinoma in Situ     SPECIFIC MOHS INDICATIONS:  location    [facility] SCORIN/9    POSTOPERATIVE DIAGNOSIS: SAME    LOCATION: Right cheek    OPERATIVE PROCEDURE:  MOHS MICROGRAPHIC SURGERY    RECONSTRUCTION OF DEFECT: Intermediate layered closure    PREOPERATIVE SIZE: 8x4 MM    DEFECT SIZE: 11x7 MM    LENGTH OF REPAIRED WOUND/SIZE OF FLAP/SIZE OF GRAFT:  24 MM    ANESTHESIA:  5mL 1% lidocaine with epinephrine 1:100,000 buffered. EBL:  MINIMAL    DURATION OF PROCEDURE:  1 HOUR    POSTOPERATIVE OBSERVATION: 1 HOUR    SPECIMENS:  SEE MOHS MAP    COMPLICATIONS:  NONE    DESCRIPTION OF PROCEDURE:  The patient was given a mirror, as appropriate, and the biopsy site was identified, marked with a surgical marking pen, and verified by the patient. Options for treatment were discussed and the patient was informed that Mohs surgery was the selected treatment based on its lower recurrence rate, given the features listed above, as compared to other treatment modalities such as excision, radiation, or curettage, and agreed with this treatment plan. Risks and benefits including bruising, swelling, bleeding, infection, nerve injury, recurrence, and scarring were discussed with the patient prior to the procedure and a written consent detailing these and other risks was reviewed with the patient and signed. There was a time out for person and procedure verification. The surgical site was prepped with an antiseptic solution. Application of an antiseptic solution was repeated before each surgical stage. Stage I:  The clinically-apparent tumor was carefully defined and debulked, determining the edge of the surgical excision.     A thin layer of tumor-laden tissue was excised with a narrow margin of normal-appearing skin, using the technique of Mohs. A map was prepared to correspond to the area of skin from which it was excised. Hemostasis was achieved using electrosurgery. The wound was bandaged. The tissue was prepared for the cryostat and sectioned. 1 section(s) prepared. Each section was coded, cut, and stained for microscopic examination. The entire base and margins of the excised piece of tissue were examined by the surgeon. The tissue was examined to the level of subcut fat. No tumor was identified at the peripheral margins of stage I of microscopically controlled surgery. DEFECT MANAGEMENT:    REPAIR DESCRIPTION:  Various closure modalities were discussed with the patient, and it was decided that an intermediate layered repair would best preserve normal anatomic and functional relationships. Additional risk of wound dehiscence was discussed. The area was anesthetized with 1% lidocaine with epinephrine 1:100,000 buffered, was given a sterile prep using Chlorhexidine gluconate 4% solution and draped in the usual sterile fashion. Recreation and enlargement of the wound was performed by excising cones of tissue via the triangulation technique. The final incision lines were placed with respect for the patient's natural skin tension lines in a linear configuration to avoid functional and aesthetic distortion of adjacent free margins. Following minimal undermining, meticulous hemostasis was obtained with spot monopolar electrocoagulation. Subcutaneous dead space and dermis were closed using 5-0 Vicryl buried subcutaneous interrupted suture and the epidermis was approximated with 5-0 Fast absorbing gut running epidermal sutures. WOUND COVERAGE:  The wound was cleaned with normal saline solution, dried off, Aquaphor ointment was applied, and the wound was covered. A dressing was applied for stabilization and light pressure.   The patient was given detailed oral and written instructions on postoperative care. There were no complications. The patient left the Unit in good medical condition. FOLLOW-UP:  As dissolving sutures were placed, the patient was asked to return if any questions or concerns arose, but otherwise will return to see general dermatology per their instructions.

## 2021-01-13 ENCOUNTER — TELEPHONE (OUTPATIENT)
Dept: SURGERY | Age: 61
End: 2021-01-13

## 2021-01-13 NOTE — TELEPHONE ENCOUNTER
The patient was in the office on 1/12/21 for Mohs located on the R cheek with [address] repair. The patient tolerated the procedure well and left the office in good condition. Pain level on post-operative day 1:  none    Any bleeding episode that required pressure to be held, bandage change or a call to the office or MD?  no     Any other issues?:  no    A post-operative telephone call was placed at 3:36PM in order to check on the patient's recovery process. The patient reported doing well and had no complaints other than those listed above, if any. All of the patient's questions were answered.

## 2021-11-10 ENCOUNTER — NURSE TRIAGE (OUTPATIENT)
Dept: OTHER | Facility: CLINIC | Age: 61
End: 2021-11-10

## 2021-11-10 NOTE — TELEPHONE ENCOUNTER
Pt could have NSTEMI that doesn't show up on EKG and should seek care now in [address] [place] [address].  Shabana Hernandez., DO 11/10/2021 12:49 PM

## 2021-11-10 NOTE — TELEPHONE ENCOUNTER
Patient called nurse triage and state that he is having chest pain on the right side after he was painting his house, he has been working out and nothing changes the pain. Patient states he will not go to the ER that is a waste of money and states that this is not his heart and we just need to see him. He said he did at in home EKG and it says it said it was normal and he knows that it is not his heart.  Patient wants to be seen in office and not at the hospital.

## 2021-11-10 NOTE — TELEPHONE ENCOUNTER
Received call from Rafa Mg at [facility] with Red Flag Complaint. Brief description of triage: as above c/o cp started on Sunday after  painting in house pain on right side into right shoulder blade hurts more to take a deep breath no fevers no sob no family hx of heart problems, states pain is constant 3/10     Triage indicates for patient to [facility]/er with pcp approval    2nd level triage completed with Katerin Up NP; provider recommends patient be seen go to er    zaheer took the call and stated that Dr Katerin Up recommends ER pt refused Zaheer talking with pt    Care advice provided, patient verbalizes understanding; denies any other questions or concerns; instructed to call back for any new or worsening symptoms. Attention Provider: Thank you for allowing me to participate in the care of your patient. The patient was connected to triage in response to information provided to the [facility]. Please do not respond through this encounter as the response is not directed to a shared pool. Reason for Disposition   Chest pain lasting longer than 5 minutes and occurred in last 3 days (72 hours) (Exception: feels exactly the same as previously diagnosed heartburn and has accompanying sour taste in mouth)    Answer Assessment - Initial Assessment Questions  1. LOCATION: \"Where does it hurt? \"        Right chest into shoulder    2. RADIATION: \"Does the pain go anywhere else? \" (e.g., into neck, jaw, arms, back)      Into shoulder blade right side    3. ONSET: \"When did the chest pain begin? \" (Minutes, hours or days)       Sunday    4. PATTERN \"Does the pain come and go, or has it been constant since it started? \"  \"Does it get worse with exertion? \"       Constant    5. DURATION: \"How long does it last\" (e.g., seconds, minutes, hours)     constant  6. SEVERITY: \"How bad is the pain? \"  (e.g., Scale 1-10; mild, moderate, or severe)     - MILD (1-3): doesn't interfere with normal activities      - MODERATE (4-7): interferes with normal activities or awakens from sleep     - SEVERE (8-10): excruciating pain, unable to do any normal activities        3/10 hurts more with deep breath    7. CARDIAC RISK FACTORS: \"Do you have any history of heart problems or risk factors for heart disease? \" (e.g., angina, prior heart attack; diabetes, high blood pressure, high cholesterol, smoker, or strong family history of heart disease)     denies   8. PULMONARY RISK FACTORS: \"Do you have any history of lung disease? \"  (e.g., blood clots in lung, asthma, emphysema, birth control pills)      Denies    9. CAUSE: \"What do you think is causing the chest pain? \"      Unsure    10. OTHER SYMPTOMS: \"Do you have any other symptoms? \" (e.g., dizziness, nausea, vomiting, sweating, fever, difficulty breathing, cough)        Denies    11. PREGNANCY: \"Is there any chance you are pregnant? \" \"When was your last menstrual period? \"        N/a    Protocols used: CHEST PAIN-ADULT-[place]

## 2021-12-02 ENCOUNTER — TELEPHONE (OUTPATIENT)
Dept: PRIMARY CARE CLINIC | Age: 61
End: 2021-12-02

## 2021-12-02 DIAGNOSIS — Z13.0 SCREENING FOR DEFICIENCY ANEMIA: ICD-10-CM

## 2021-12-02 DIAGNOSIS — Z12.5 SCREENING PSA (PROSTATE SPECIFIC ANTIGEN): ICD-10-CM

## 2021-12-02 DIAGNOSIS — Z13.21 ENCOUNTER FOR VITAMIN DEFICIENCY SCREENING: ICD-10-CM

## 2021-12-02 DIAGNOSIS — Z00.00 ANNUAL PHYSICAL EXAM: Primary | ICD-10-CM

## 2021-12-02 DIAGNOSIS — N52.9 ERECTILE DYSFUNCTION, UNSPECIFIED ERECTILE DYSFUNCTION TYPE: ICD-10-CM

## 2021-12-02 DIAGNOSIS — Z13.1 DIABETES MELLITUS SCREENING: ICD-10-CM

## 2021-12-02 DIAGNOSIS — N40.0 BENIGN PROSTATIC HYPERPLASIA WITHOUT URINARY OBSTRUCTION: ICD-10-CM

## 2021-12-02 DIAGNOSIS — E78.2 MIXED HYPERLIPIDEMIA: ICD-10-CM

## 2021-12-02 NOTE — TELEPHONE ENCOUNTER
----- Message from Monique You sent at 12/2/2021  1:50 PM EST -----  Subject: Message to Provider    QUESTIONS  Information for Provider? The patient is calling today in regards to   upcoming appointment in January for Complete Annual Physical. The patient   would like to have a lab order to have labs drawn prior to this   appointment. Please call the patient.  ---------------------------------------------------------------------------  --------------  CALL BACK INFO  What is the best way for the office to contact you? OK to leave message on   voicemail  Preferred Call Back Phone Number? [phone]  ---------------------------------------------------------------------------  --------------  SCRIPT ANSWERS  Relationship to Patient?  Self

## 2021-12-03 NOTE — TELEPHONE ENCOUNTER
Orders placed    1. Annual physical exam  - Comprehensive Metabolic Panel; Future  - Lipid Panel; Future  - CBC Auto Differential; Future  - VITAMIN B12 & FOLATE; Future  - MAGNESIUM; Future  - PSA, Prostatic Specific Antigen; Future  - URINALYSIS WITH MICROSCOPIC; Future  - Vitamin D 25 Hydroxy; Future  - HEMOGLOBIN A1C; Future    2. Mixed hyperlipidemia  - Lipid Panel; Future    3. Erectile dysfunction, unspecified erectile dysfunction type  - PSA, Prostatic Specific Antigen; Future  - URINALYSIS WITH MICROSCOPIC; Future    4. Benign prostatic hyperplasia without urinary obstruction  - PSA, Prostatic Specific Antigen; Future  - URINALYSIS WITH MICROSCOPIC; Future    5. Screening for deficiency anemia  - CBC Auto Differential; Future  - VITAMIN B12 & FOLATE; Future    6. Screening PSA (prostate specific antigen)  - PSA, Prostatic Specific Antigen; Future  - URINALYSIS WITH MICROSCOPIC; Future    7. Diabetes mellitus screening  - HEMOGLOBIN A1C; Future    8. Encounter for vitamin deficiency screening  - Vitamin D 25 Hydroxy;  Future

## 2021-12-17 DIAGNOSIS — Z13.0 SCREENING FOR DEFICIENCY ANEMIA: ICD-10-CM

## 2021-12-17 DIAGNOSIS — Z13.1 DIABETES MELLITUS SCREENING: ICD-10-CM

## 2021-12-17 DIAGNOSIS — N40.0 BENIGN PROSTATIC HYPERPLASIA WITHOUT URINARY OBSTRUCTION: ICD-10-CM

## 2021-12-17 DIAGNOSIS — Z00.00 ANNUAL PHYSICAL EXAM: ICD-10-CM

## 2021-12-17 DIAGNOSIS — N52.9 ERECTILE DYSFUNCTION, UNSPECIFIED ERECTILE DYSFUNCTION TYPE: ICD-10-CM

## 2021-12-17 DIAGNOSIS — Z13.21 ENCOUNTER FOR VITAMIN DEFICIENCY SCREENING: ICD-10-CM

## 2021-12-17 DIAGNOSIS — E78.2 MIXED HYPERLIPIDEMIA: ICD-10-CM

## 2021-12-17 DIAGNOSIS — Z12.5 SCREENING PSA (PROSTATE SPECIFIC ANTIGEN): ICD-10-CM

## 2021-12-17 LAB
A/G RATIO: 2 (ref 1.1–2.2)
ALBUMIN SERPL-MCNC: 4.5 G/DL (ref 3.4–5)
ALP BLD-CCNC: 67 U/L (ref 40–129)
ALT SERPL-CCNC: 26 U/L (ref 10–40)
ANION GAP SERPL CALCULATED.3IONS-SCNC: 14 MMOL/L (ref 3–16)
AST SERPL-CCNC: 30 U/L (ref 15–37)
BASOPHILS ABSOLUTE: 0 K/UL (ref 0–0.2)
BASOPHILS RELATIVE PERCENT: 0.5 %
BILIRUB SERPL-MCNC: 0.6 MG/DL (ref 0–1)
BILIRUBIN URINE: NEGATIVE
BLOOD, URINE: NEGATIVE
BUN BLDV-MCNC: 18 MG/DL (ref 7–20)
CALCIUM SERPL-MCNC: 9.6 MG/DL (ref 8.3–10.6)
CHLORIDE BLD-SCNC: 102 MMOL/L (ref 99–110)
CHOLESTEROL, TOTAL: 214 MG/DL (ref 0–199)
CLARITY: CLEAR
CO2: 25 MMOL/L (ref 21–32)
COLOR: YELLOW
CREAT SERPL-MCNC: 1.2 MG/DL (ref 0.8–1.3)
EOSINOPHILS ABSOLUTE: 0.3 K/UL (ref 0–0.6)
EOSINOPHILS RELATIVE PERCENT: 4.3 %
EPITHELIAL CELLS, UA: 0 /HPF (ref 0–5)
FOLATE: >20 NG/ML (ref 4.78–24.2)
GFR AFRICAN AMERICAN: >60
GFR NON-AFRICAN AMERICAN: >60
GLUCOSE BLD-MCNC: 95 MG/DL (ref 70–99)
GLUCOSE URINE: NEGATIVE MG/DL
HCT VFR BLD CALC: 44.7 % (ref 40.5–52.5)
HDLC SERPL-MCNC: 88 MG/DL (ref 40–60)
HEMOGLOBIN: 15.1 G/DL (ref 13.5–17.5)
HYALINE CASTS: 0 /LPF (ref 0–8)
KETONES, URINE: NEGATIVE MG/DL
LDL CHOLESTEROL CALCULATED: 116 MG/DL
LEUKOCYTE ESTERASE, URINE: NEGATIVE
LYMPHOCYTES ABSOLUTE: 1.8 K/UL (ref 1–5.1)
LYMPHOCYTES RELATIVE PERCENT: 30 %
MAGNESIUM: 2.3 MG/DL (ref 1.8–2.4)
MCH RBC QN AUTO: 31.5 PG (ref 26–34)
MCHC RBC AUTO-ENTMCNC: 33.9 G/DL (ref 31–36)
MCV RBC AUTO: 92.9 FL (ref 80–100)
MICROSCOPIC EXAMINATION: NORMAL
MONOCYTES ABSOLUTE: 0.6 K/UL (ref 0–1.3)
MONOCYTES RELATIVE PERCENT: 9.5 %
NEUTROPHILS ABSOLUTE: 3.3 K/UL (ref 1.7–7.7)
NEUTROPHILS RELATIVE PERCENT: 55.7 %
NITRITE, URINE: NEGATIVE
PDW BLD-RTO: 12.8 % (ref 12.4–15.4)
PH UA: 7 (ref 5–8)
PLATELET # BLD: 192 K/UL (ref 135–450)
PMV BLD AUTO: 8 FL (ref 5–10.5)
POTASSIUM SERPL-SCNC: 4.6 MMOL/L (ref 3.5–5.1)
PROSTATE SPECIFIC ANTIGEN: 0.67 NG/ML (ref 0–4)
PROTEIN UA: NEGATIVE MG/DL
RBC # BLD: 4.81 M/UL (ref 4.2–5.9)
RBC UA: 0 /HPF (ref 0–4)
SODIUM BLD-SCNC: 141 MMOL/L (ref 136–145)
SPECIFIC GRAVITY UA: 1.01 (ref 1–1.03)
TOTAL PROTEIN: 6.7 G/DL (ref 6.4–8.2)
TRIGL SERPL-MCNC: 49 MG/DL (ref 0–150)
URINE TYPE: NORMAL
UROBILINOGEN, URINE: 0.2 E.U./DL
VITAMIN B-12: 1177 PG/ML (ref 211–911)
VITAMIN D 25-HYDROXY: 63.2 NG/ML
VLDLC SERPL CALC-MCNC: 10 MG/DL
WBC # BLD: 6 K/UL (ref 4–11)
WBC UA: 0 /HPF (ref 0–5)

## 2021-12-18 LAB
ESTIMATED AVERAGE GLUCOSE: 111.2 MG/DL
HBA1C MFR BLD: 5.5 %

## 2022-01-03 ENCOUNTER — OFFICE VISIT (OUTPATIENT)
Dept: PRIMARY CARE CLINIC | Age: 62
End: 2022-01-03
Payer: COMMERCIAL

## 2022-01-03 VITALS
WEIGHT: 141.6 LBS | DIASTOLIC BLOOD PRESSURE: 82 MMHG | SYSTOLIC BLOOD PRESSURE: 116 MMHG | HEIGHT: 67 IN | HEART RATE: 63 BPM | BODY MASS INDEX: 22.22 KG/M2 | TEMPERATURE: 97.2 F | OXYGEN SATURATION: 97 %

## 2022-01-03 DIAGNOSIS — R00.2 PALPITATIONS: ICD-10-CM

## 2022-01-03 DIAGNOSIS — E78.2 MIXED HYPERLIPIDEMIA: ICD-10-CM

## 2022-01-03 DIAGNOSIS — R19.8 GASTROINTESTINAL PROBLEM: ICD-10-CM

## 2022-01-03 DIAGNOSIS — Z28.21 COVID-19 VACCINATION REFUSED: ICD-10-CM

## 2022-01-03 DIAGNOSIS — Z82.49 FAMILY HISTORY OF ACUTE MYOCARDIAL INFARCTION: ICD-10-CM

## 2022-01-03 DIAGNOSIS — R07.9 CHEST PAIN, UNSPECIFIED TYPE: ICD-10-CM

## 2022-01-03 DIAGNOSIS — J30.1 NON-SEASONAL ALLERGIC RHINITIS DUE TO POLLEN: ICD-10-CM

## 2022-01-03 DIAGNOSIS — Z00.00 ANNUAL PHYSICAL EXAM: Primary | ICD-10-CM

## 2022-01-03 DIAGNOSIS — Z28.21 INFLUENZA VACCINE REFUSED: ICD-10-CM

## 2022-01-03 DIAGNOSIS — E73.9 LACTOSE INTOLERANCE: ICD-10-CM

## 2022-01-03 PROCEDURE — 99396 PREV VISIT EST AGE 40-64: CPT | Performed by: FAMILY MEDICINE

## 2022-01-03 PROCEDURE — G8484 FLU IMMUNIZE NO ADMIN: HCPCS | Performed by: FAMILY MEDICINE

## 2022-01-03 PROCEDURE — 93000 ELECTROCARDIOGRAM COMPLETE: CPT | Performed by: FAMILY MEDICINE

## 2022-01-03 SDOH — ECONOMIC STABILITY: FOOD INSECURITY: WITHIN THE PAST 12 MONTHS, YOU WORRIED THAT YOUR FOOD WOULD RUN OUT BEFORE YOU GOT MONEY TO BUY MORE.: NEVER TRUE

## 2022-01-03 SDOH — ECONOMIC STABILITY: FOOD INSECURITY: WITHIN THE PAST 12 MONTHS, THE FOOD YOU BOUGHT JUST DIDN'T LAST AND YOU DIDN'T HAVE MONEY TO GET MORE.: NEVER TRUE

## 2022-01-03 ASSESSMENT — PATIENT HEALTH QUESTIONNAIRE - PHQ9
SUM OF ALL RESPONSES TO PHQ QUESTIONS 1-9: 0
8. MOVING OR SPEAKING SO SLOWLY THAT OTHER PEOPLE COULD HAVE NOTICED. OR THE OPPOSITE, BEING SO FIGETY OR RESTLESS THAT YOU HAVE BEEN MOVING AROUND A LOT MORE THAN USUAL: 0
SUM OF ALL RESPONSES TO PHQ QUESTIONS 1-9: 0
SUM OF ALL RESPONSES TO PHQ QUESTIONS 1-9: 0
3. TROUBLE FALLING OR STAYING ASLEEP: 0
5. POOR APPETITE OR OVEREATING: 0
7. TROUBLE CONCENTRATING ON THINGS, SUCH AS READING THE NEWSPAPER OR WATCHING TELEVISION: 0
4. FEELING TIRED OR HAVING LITTLE ENERGY: 0
1. LITTLE INTEREST OR PLEASURE IN DOING THINGS: 0
10. IF YOU CHECKED OFF ANY PROBLEMS, HOW DIFFICULT HAVE THESE PROBLEMS MADE IT FOR YOU TO DO YOUR WORK, TAKE CARE OF THINGS AT HOME, OR GET ALONG WITH OTHER PEOPLE: 0
9. THOUGHTS THAT YOU WOULD BE BETTER OFF DEAD, OR OF HURTING YOURSELF: 0
6. FEELING BAD ABOUT YOURSELF - OR THAT YOU ARE A FAILURE OR HAVE LET YOURSELF OR YOUR FAMILY DOWN: 0
SUM OF ALL RESPONSES TO PHQ9 QUESTIONS 1 & 2: 0
2. FEELING DOWN, DEPRESSED OR HOPELESS: 0
SUM OF ALL RESPONSES TO PHQ QUESTIONS 1-9: 0

## 2022-01-03 ASSESSMENT — ANXIETY QUESTIONNAIRES
3. WORRYING TOO MUCH ABOUT DIFFERENT THINGS: 0
GAD7 TOTAL SCORE: 0
5. BEING SO RESTLESS THAT IT IS HARD TO SIT STILL: 0
4. TROUBLE RELAXING: 0
7. FEELING AFRAID AS IF SOMETHING AWFUL MIGHT HAPPEN: 0
2. NOT BEING ABLE TO STOP OR CONTROL WORRYING: 0
IF YOU CHECKED OFF ANY PROBLEMS ON THIS QUESTIONNAIRE, HOW DIFFICULT HAVE THESE PROBLEMS MADE IT FOR YOU TO DO YOUR WORK, TAKE CARE OF THINGS AT HOME, OR GET ALONG WITH OTHER PEOPLE: NOT DIFFICULT AT ALL
6. BECOMING EASILY ANNOYED OR IRRITABLE: 0
1. FEELING NERVOUS, ANXIOUS, OR ON EDGE: 0

## 2022-01-03 ASSESSMENT — ENCOUNTER SYMPTOMS
DIARRHEA: 1
RHINORRHEA: 0
ABDOMINAL PAIN: 1
SHORTNESS OF BREATH: 0
WHEEZING: 0
EYE DISCHARGE: 0
SORE THROAT: 0
BACK PAIN: 0
BLOOD IN STOOL: 0
EYE PAIN: 0
CONSTIPATION: 0
COLOR CHANGE: 0

## 2022-01-03 ASSESSMENT — SOCIAL DETERMINANTS OF HEALTH (SDOH): HOW HARD IS IT FOR YOU TO PAY FOR THE VERY BASICS LIKE FOOD, HOUSING, MEDICAL CARE, AND HEATING?: NOT HARD AT ALL

## 2022-01-03 NOTE — PROGRESS NOTES
Linda Hatfield     1960    Consultants:  Patient Care Team:  Home Rosas DO as PCP - General (Family Medicine)  Zachery De La Garza. Johana Rosas DO as PCP - [facility] Empaneled Provider  Irish Rajan MD as Surgeon (Orthopedic Surgery)  Ajit Bal MD (Orthopedic Surgery)    Chief Complaint:  HPI:    Linda Hatfield is a 60 y.o. male  established patient who presents for:    Chief Complaint   Patient presents with    Annual Exam    GI Problem    Flank Pain    Palpitations    Abdominal Pain    Skin Cancer    Immunizations    Health Maintenance     No real changes/meaninful changes in his life. Feels about the same in past 18 months  No particular concerns today except the following:    -Wants to discuss RUQ pain, chest pain, shoulder blade pain, palpitations:  Symptom onset afternoon 11/7/2021, improved by 11/11/2021, resolved by 11/12/2021  RUQ/Right flank/scapular pain  Called office 3 months ago about this  Painful and lasted about 48 hours  Had loose bowel movement  Was like this until pain was gone  Will have times where he has noticeable heart beat, palpable and heart is beating. .. Was at neighbor's house having discussion with others and wasn't super actively engaged but then he felt that feeling    -Gastrointestinal Problem:  Has had digestive issues historically  Has been logging his food and bowel habits in the past 4 months  Improved with eating less, vegetables, fish, lean meats  Just ate sweets and other foods at holidays  Saw Dr. Shane Lincoln at [facility] scope negative in 2020, will get record    -History Skin Cancer:   Had MOHS surgery  Sees dermatology regularly  Uses Imiquimod    -Immunizations:  -due for shingles and Flu shot  -defers Flu shot today  -had Shingles/zoster like rash and tingling in neck/back about 7 months ago that lasted several weeks and had appointment with [facility] in [place] for digestive related issues, saw MD dx shingles, dermatologist disagreed. ..    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 Authorizing Provider   EPINEPHrine (EPIPEN 2-SHANEKA) 0.3 MG/0.3ML SOAJ injection INJECT INTO THE MIDDLE OF THE OUTER THIGH AND HOLD FOR 10 SECONDS AS NEEDED FOR SEVERE ALLERGIC REACTION  Beverly Sargent. Butch Aiken., DO   clobetasol (TEMOVATE) 0.05 % ointment   Historical Provider, MD   imiquimod (ALDARA) 5 % cream as needed   Historical Provider, MD   tadalafil (CIALIS) 5 MG tablet Take 5 mg by mouth as needed for Erectile Dysfunction  Historical Provider, MD   fluticasone (FLONASE) 50 MCG/ACT nasal spray 2 sprays by Nasal route daily. Hanny Hurtado MD   Flaxseed, Linseed, 1000 MG CAPS Take 1 capsule by mouth Daily. Historical Provider, MD   fish oil-omega-3 fatty acids 1000 MG capsule Take 1 g by mouth daily. Historical Provider, MD   multivitamin [facility]) per tablet Take 1 tablet by mouth daily. Historical Provider, MD       Allergies:    Peanut-containing drug products, Penicillins, Sulfa antibiotics, and Peanuts [peanut oil]    Family History:       Problem Relation Age of Onset    Arthritis Father     Heart Disease Father     High Blood Pressure Father     High Cholesterol Father     Cancer Brother         carcinoids    Diabetes Maternal Grandfather     Diabetes Paternal Grandmother     Cancer Paternal Grandfather     Arthritis Mother        Social History  Social History     Socioeconomic History    Marital status:      Spouse name: Mindi Dickson    Number of children: 2    Years of education: Not on file    Highest education level: Not on file   Occupational History    Occupation: Consulting work     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, [employer]   Tobacco Use    Smoking status: Never Smoker    Smokeless tobacco: Never Used   Vaping Use    Vaping Use: Never used   Substance and Sexual Activity    Alcohol use: Yes     Comment: social 2-3 x a week    Drug use: No    Sexual activity: Yes     Partners: Female   Other Topics Concern    Not on file   Social History Narrative    Wife Juanita Arnett healthy         Had something with eye        3 kids: healthy all live out of town        -33 y/o son        -33 y/o son dx with COVID; works as  [employer]        -23 y/o daughter lives in [facility], was home for [employer] last year, felt bad last year on Xmas Robina, thought she might have flu, was home for over a week due to illness, in retrospect symptoms were COVID like symptoms. Tested positive for antibodies     Social Determinants of Health     Financial Resource Strain: Low Risk     Difficulty of Paying Living Expenses: Not hard at all   Food Insecurity: No Food Insecurity    Worried About Running Out of Food in the Last Year: Never true    Patrice of Food in the Last Year: Never true   Transportation Needs:     Lack of Transportation (Medical):  Not on file  Lack of Transportation (Non-Medical): Not on file   Physical Activity:     Days of Exercise per Week: Not on file    Minutes of Exercise per Session: Not on file   Stress:     Feeling of Stress : Not on file   Social Connections:     Frequency of Communication with Friends and Family: Not on file    Frequency of Social Gatherings with Friends and Family: Not on file    Attends Judaism Services: Not on file    Active Member of [address] [place] [address] or Organizations: Not on file    Attends Club or Organization Meetings: Not on file    Marital Status: Not on file   Intimate Partner Violence:     Fear of Current or Ex-Partner: Not on file    Emotionally Abused: Not on file    Physically Abused: Not on file    Sexually Abused: Not on file   Housing Stability:     Unable to Pay for Housing in the Last Year: Not on file    Number of [place] in the Last Year: Not on file    Unstable Housing in the Last Year: Not on file         Health Maintenance Completed:  Health Maintenance   Topic Date Due    COVID-19 Vaccine (1) Never done    Depression Screen  Never done    Shingles Vaccine (1 of 2) Never done    Flu vaccine (1) 09/01/2021    Colon cancer screen colonoscopy  02/27/2022    Lipid screen  12/17/2026    DTaP/Tdap/Td vaccine (2 - Td or Tdap) 04/05/2029    Hepatitis C screen  Completed    HIV screen  Completed    Hepatitis A vaccine  Aged Out    Hepatitis B vaccine  Aged Out    Hib vaccine  Aged Out    Meningococcal (ACWY) vaccine  Aged Out    Pneumococcal 0-64 years Vaccine  Aged [employer] History   Administered Date(s) Administered    Influenza A (Y3Q3-91) Vaccine PF IM 12/21/2009    Influenza Virus Vaccine 10/19/2015    Td, unspecified formulation 11/25/1999, 03/19/2009    Tdap (Boostrix, Adacel) 03/19/2009, 04/05/2019     Review of Systems   Constitutional: Negative for chills, fever and unexpected weight change. HENT: Negative for congestion, rhinorrhea and sore throat. Eyes: Negative for pain, discharge and visual disturbance. Respiratory: Negative for shortness of breath and wheezing. Cardiovascular: Positive for chest pain, palpitations and leg swelling. Gastrointestinal: Positive for abdominal pain and diarrhea. Negative for blood in stool and constipation. Endocrine: Negative for polyuria. Genitourinary: Negative for dysuria and flank pain. Musculoskeletal: Negative for arthralgias, back pain and neck pain. Skin: Negative for color change, rash and wound. Allergic/Immunologic: Negative for environmental allergies, food allergies and immunocompromised state. Neurological: Negative for dizziness, speech difficulty, weakness, light-headedness and headaches. Hematological: Negative for adenopathy. Does not bruise/bleed easily. Psychiatric/Behavioral: Negative for dysphoric mood and sleep disturbance. The patient is not nervous/anxious (stress). Vitals:    01/03/22 0849   BP: 116/82   Pulse: 63   Temp: 97.2 °F (36.2 °C)   TempSrc: Temporal   SpO2: 97%   Weight: 141 lb 9.6 oz (64.2 kg)   Height: 5' 7\" (1.702 m)     Body mass index is 22.18 kg/m². Wt Readings from Last 3 Encounters:   01/03/22 141 lb 9.6 oz (64.2 kg)   09/23/20 134 lb 12.8 oz (61.1 kg)   05/14/19 137 lb (62.1 kg)       BP Readings from Last 3 Encounters:   01/03/22 116/82   01/12/21 121/72   09/23/20 98/64       Physical Exam  Constitutional:       General: He is not in acute distress. Appearance: He is well-developed. HENT:      Head: Normocephalic and atraumatic. Right Ear: Tympanic membrane normal.      Left Ear: Tympanic membrane normal.      Nose: Nose normal. No rhinorrhea. Mouth/Throat:      Pharynx: Uvula midline. Eyes:      Pupils: Pupils are equal, round, and reactive to light. Neck:      Trachea: No tracheal deviation. Cardiovascular:      Rate and Rhythm: Normal rate and regular rhythm. Heart sounds: Normal heart sounds. No murmur heard.   No friction rub. No gallop. Pulmonary:      Effort: Pulmonary effort is normal. No respiratory distress. Breath sounds: Normal breath sounds. No wheezing or rales. Abdominal:      General: Bowel sounds are normal. There is no distension. Palpations: Abdomen is soft. Tenderness: There is no abdominal tenderness. There is no rebound. Musculoskeletal:         General: No tenderness. Normal range of motion. Lymphadenopathy:      Cervical: No cervical adenopathy. Skin:     General: Skin is warm and dry. Findings: No erythema or rash. Neurological:      Mental Status: He is alert and oriented to person, place, and time. Cranial Nerves: No cranial nerve deficit. Deep Tendon Reflexes:      Reflex Scores:       Patellar reflexes are 2+ on the right side and 2+ on the left side. Psychiatric:         Speech: Speech normal.         Thought Content: Thought content does not include homicidal or suicidal ideation. Lab Review:  EKG today: 1/3/2022 interpreted by Mayda Alatorre.  Mavis Berry., DO   Rate 55 braydcardia , sinus rhythm left axis deviation, no interval lengthening, no ventricular hypertrophy, no ischemia or infarct, ST elevations V4-V6 but isoelectric  Sinus bradycardia    Lab Results   Component Value Date    LABA1C 5.5 12/17/2021     Lab Results   Component Value Date    .2 12/17/2021     Lab Results   Component Value Date     12/17/2021    K 4.6 12/17/2021     12/17/2021    CO2 25 12/17/2021    BUN 18 12/17/2021    CREATININE 1.2 12/17/2021    GLUCOSE 95 12/17/2021    CALCIUM 9.6 12/17/2021    PROT 6.7 12/17/2021    LABALBU 4.5 12/17/2021    BILITOT 0.6 12/17/2021    ALKPHOS 67 12/17/2021    AST 30 12/17/2021    ALT 26 12/17/2021    LABGLOM >60 12/17/2021    GFRAA >60 12/17/2021    AGRATIO 2.0 12/17/2021    GLOB 1.8 09/29/2020       Lab Results   Component Value Date    ZQYBRUVJ33 1177 (H) 12/17/2021     Lab Results   Component Value Date    FOLATE >20.00 12/17/2021     Lab Results   Component Value Date    WBC 6.0 12/17/2021    HGB 15.1 12/17/2021    HCT 44.7 12/17/2021    MCV 92.9 12/17/2021     12/17/2021       Lab Results   Component Value Date    CHOL 214 (H) 12/17/2021    CHOL 217 (H) 09/29/2020    CHOL 212 (H) 04/17/2019     Lab Results   Component Value Date    TRIG 49 12/17/2021    TRIG 55 09/29/2020    TRIG 63 04/17/2019     Lab Results   Component Value Date    HDL 88 (H) 12/17/2021    HDL 98 (H) 09/29/2020    HDL 94 (H) 04/17/2019     Lab Results   Component Value Date    LDLCALC 116 (H) 12/17/2021    LDLCALC 108 (H) 09/29/2020    LDLCALC 105 (H) 04/17/2019     Lab Results   Component Value Date    LABVLDL 10 12/17/2021    LABVLDL 11 09/29/2020    LABVLDL 13 04/17/2019     Lab Results   Component Value Date    CHOLHDLRATIO 3.0 08/31/2011     The 10-year ASCVD risk score (Srinivas Garcia et al., 2013) is: 6.8%    Values used to calculate the score:      Age: 60 years      Sex: Male      Is Non- : No      Diabetic: No      Tobacco smoker: No      Systolic Blood Pressure: 309 mmHg      Is BP treated: No      HDL Cholesterol: 88 mg/dL      Total Cholesterol: 214 mg/dL    Lab Results   Component Value Date    COLORU YELLOW 12/17/2021    NITRU Negative 12/17/2021    GLUCOSEU Negative 12/17/2021    KETUA Negative 12/17/2021    UROBILINOGEN 0.2 12/17/2021    BILIRUBINUR Negative 12/17/2021    BILIRUBINUR neg 10/04/2017   '     Vitamin d Level 63.2 ng/mL      Lab Results   Component Value Date    MG 2.30 12/17/2021       Assessment/Plan:  Rosana Roberts was seen today for annual exam, gi problem, flank pain, palpitations, abdominal pain, skin cancer, immunizations and health maintenance. 1. Annual physical exam  -Chart/records reviewed, history and physical performed, health maintenance addressed and updated, presenting problems addressed.   -Recommend 150 minutes of cardiovascular activity a week, or 10,000 to 15,000 steps a day, 2 days of weightbearing  -avoid processed/refined carbohydrates (boxed/canned/frozen/fast)  -encourage healthy protein and fat, butter, avocado, egg      2. Palpitations  3. Family history of acute myocardial infarction  4. Chest pain, unspecified type  Discussed in detail risk factors, symptoms  EKG and holter monitor ordered, cardiology referral for further risk stratification/mitigation/optimization of heart health  EKG sinus ave as above  - Cardiac event monitor; Future  - Wiley Cummins MD, Cardiology, [place]  - EKG 12 lead; Future  - EKG 12 lead  - DC ELECTROCARDIOGRAM, COMPLETE    5. Mixed hyperlipidemia  Total >200 but no statin indicated  Consider NMR protein LDL size next year or CT calcium score for further risk stratification  Lab Results   Component Value Date    CHOL 214 (H) 12/17/2021    CHOL 217 (H) 09/29/2020    CHOL 212 (H) 04/17/2019     Lab Results   Component Value Date    TRIG 49 12/17/2021    TRIG 55 09/29/2020    TRIG 63 04/17/2019     Lab Results   Component Value Date    HDL 88 (H) 12/17/2021    HDL 98 (H) 09/29/2020    HDL 94 (H) 04/17/2019     Lab Results   Component Value Date    LDLCALC 116 (H) 12/17/2021    LDLCALC 108 (H) 09/29/2020    LDLCALC 105 (H) 04/17/2019     Lab Results   Component Value Date    LABVLDL 10 12/17/2021    LABVLDL 11 09/29/2020    LABVLDL 13 04/17/2019     Lab Results   Component Value Date    CHOLHDLRATIO 3.0 08/31/2011     The 10-year ASCVD risk score (Chato Roman, et al., 2013) is: 6.8%    Values used to calculate the score:      Age: 60 years      Sex: Male      Is Non- : No      Diabetic: No      Tobacco smoker: No      Systolic Blood Pressure: 626 mmHg      Is BP treated: No      HDL Cholesterol: 88 mg/dL      Total Cholesterol: 214 mg/dL    6. Gastrointestinal problem  7.  Lactose intolerance  -continue dietary restrictions/modification  -regular GI follow up  -consider RUQ US or HIDA scan if symptoms recur from 11/2021    8. Non-seasonal allergic rhinitis due to pollen  -Flonase 50 mcg nasal spray PRN PRN      Health Maintenance Due:  Health Maintenance Due   Topic Date Due    COVID-19 Vaccine (1) Never done    Depression Screen  Never done    Shingles Vaccine (1 of 2) Never done    Flu vaccine (1) 09/01/2021          Health Maintenance:  (M) Prostate Cancer Screen: (USPSTF recs: men 53-79 yo discuss benefits/harm,  does not recommend testing PSA in men >75 yo (D):   Lab Results   Component Value Date    PSA 0.67 12/17/2021    PSA 0.63 09/29/2020    PSA 0.50 04/17/2019       CRC/Colonoscopy Screening: had Colonoscopy in 2020, repeat in 5 years, will get records    Immunizations:  9. COVID-19 vaccination refused  10. Influenza vaccine refused  -Defers COVID vaccine, shingles vaccine deferred due to recent shingles vaccine  -Flu shot defers today      Return in about 1 year (around 1/3/2023) for annual exam.       Spent 40-54 minutes of face to face interaction with patient counseling on diagnoses and treatment plan; including but not limited to pre visit planning, chart/lab review, new orders, instructions, charting      EMR Dragon/transcription disclaimer:  Much of this encounter note is electronic transcription/translation of spoken language to printed texts. The electronic translation of spoken language may be erroneous, or at times, nonsensical words or phrases may be inadvertently transcribed. Although I have reviewed the note for such errors, some may still exist.       Jose Stone.  Vidya Cardoza., DO  1/3/2022

## 2022-01-03 NOTE — PATIENT INSTRUCTIONS
-Get EKG today    -Get set up for holter monitor by calling [phone] or see Dr. Lucila Lombardi for cardiac risk evaluation    -If RUQ pain/shoulder blade pain     -Consider shingles vaccine this Summer    -Consider covid vaccine    -Repeat colonoscopy in future, will get records for recommendations    -Recommend 150 minutes of cardiovascular activity a week, or 10,000 to 15,000 steps a day, 2 days of weightbearing  -avoid processed/refined carbohydrates (boxed/canned/frozen/fast)  -encourage healthy protein and fat, butter, avocado, egg

## 2022-04-12 ENCOUNTER — TELEPHONE (OUTPATIENT)
Dept: SURGERY | Age: 62
End: 2022-04-12

## 2022-04-12 NOTE — TELEPHONE ENCOUNTER
The patient called office. He said he like to set up time to have his scar revised with the laser which was offered to him at the time when he had his second Mohs appt on 1/12/2021 for Mohs of a n SCCIs on the R cheek. The site he's concerned of with the scarring is on his forehead from Mohs on 5/14/19 SCC Forehead. Please advise yeah or no in scheduling scar revision and when, May, June or July?

## 2022-04-12 NOTE — TELEPHONE ENCOUNTER
Given the length of time since we discussed this, and since I can not find a photo of this area that we took here in the office, I would need to examine the area to determine what if anything would improve the aspects of the scar he has issues with. He can schedule an appt on an afternoon when the vbeam would be available.

## 2022-04-14 NOTE — TELEPHONE ENCOUNTER
Pt scheduled for an office visit, explained that it would be billed to insurance and that at that appt Dr. Jennifer Randle will determine if area can be treated and with what treatment.

## 2022-06-22 ENCOUNTER — OFFICE VISIT (OUTPATIENT)
Dept: SURGERY | Age: 62
End: 2022-06-22
Payer: COMMERCIAL

## 2022-06-22 DIAGNOSIS — L90.5 SCAR CONDITION AND FIBROSIS OF SKIN: Primary | ICD-10-CM

## 2022-06-22 PROCEDURE — 99212 OFFICE O/P EST SF 10 MIN: CPT | Performed by: DERMATOLOGY

## 2022-06-22 PROCEDURE — 3017F COLORECTAL CA SCREEN DOC REV: CPT | Performed by: DERMATOLOGY

## 2022-06-22 PROCEDURE — G8427 DOCREV CUR MEDS BY ELIG CLIN: HCPCS | Performed by: DERMATOLOGY

## 2022-06-22 PROCEDURE — 1036F TOBACCO NON-USER: CPT | Performed by: DERMATOLOGY

## 2022-06-22 PROCEDURE — G8420 CALC BMI NORM PARAMETERS: HCPCS | Performed by: DERMATOLOGY

## 2022-06-22 NOTE — PROGRESS NOTES
Laser Procedure Note       Vaibhav Montero   YOB: 1960    DATE OF VISIT:  6/22/2022    LASER: vbeam  DIAGNOSIS:  1. Scar condition and fibrosis of skin       S/p mohs in 2019 on forehead, pt c/o erytheam      PATIENT IDENTIFIED PER PROTOCOL: yes  LOCATION(S): scar on forehead and angioma on nose  VERIFIED AND MARKED: yes  TECHNIQUES, RISKS, BENEFITS AND ALTERNATIVES EXPLAINED: yes  CONSENT SIGNED, WITNESSED AND DATED: yes      RISKS: Pain with treatment, swelling, pigmentary changes, scarring, blisters/crusting, non-resolution, recurrence, unwanted cosmetic outcome, the need for multiple treatments. In addition, the importance of sun avoidance/protection and avoiding manipulation to the areas were emphasized with the patient. OPERATIVE REPORT      TREATMENT # 1  AREA TO BE TREATED: scar telangiectasias on forehead and angioma on nasal tip  DIAGNOSIS, LOCATION, PROCEDURE RECONFIRMED: yes   EYE PROTECTION: yes  ANESTHESIA/PRE-OP MEDICATIONS: none  LASER SETTINGS:  (1)  WAVELENGTH: vbeam LENS: 7mm FLUENCE: 8j PULSE DURATION: 1.5ms COOLING: yes      PROCEDURE NOTE:  2 passes    POST-OPERATIVE CARE/DISPOSITION: good  COMPLICATIONS: none  MEDICATIONS: none  WOUND CARE INSTRUCTIONS PROVIDED: yes    No follow-ups on file.         F/u prn